# Patient Record
Sex: MALE | Race: WHITE | Employment: FULL TIME | URBAN - METROPOLITAN AREA
[De-identification: names, ages, dates, MRNs, and addresses within clinical notes are randomized per-mention and may not be internally consistent; named-entity substitution may affect disease eponyms.]

---

## 2017-01-06 ENCOUNTER — CARE COORDINATION (OUTPATIENT)
Dept: CARDIOLOGY | Facility: CLINIC | Age: 32
End: 2017-01-06

## 2017-01-06 DIAGNOSIS — D86.85 CARDIAC SARCOIDOSIS: Primary | ICD-10-CM

## 2017-01-06 NOTE — PROGRESS NOTES
Called patient to review recommendations of pulmonary team and Dr. Pino to have CT chest to evaluate mediastinal lymph node for possible biopsy to confirm sarcoidosis. Coordinated CT chest at local hospital on Monday afternoon. They will push images to us then pulmonary team will review. Requested by local [facility] to complete pre-authorization for testing. Will contact insurance on Monday AM to coordinate this then fax letter to them at [phone], will also call them with can update at [phone]. Spoke with Janeen/Radha. Patient gave insurance info of [employer] member ID [id number] with phone [phone]. Will update patient on plan of care once images have been reviewed and plan for biopsy determined. They currently have taken off work and plan to be in the area Thursday and Friday.

## 2017-01-09 ENCOUNTER — TRANSFERRED RECORDS (OUTPATIENT)
Dept: HEALTH INFORMATION MANAGEMENT | Facility: CLINIC | Age: 32
End: 2017-01-09

## 2017-01-09 ENCOUNTER — PRE VISIT (OUTPATIENT)
Dept: CARDIOLOGY | Facility: CLINIC | Age: 32
End: 2017-01-09

## 2017-01-09 NOTE — PROGRESS NOTES
Spoke with [place] [employer] regarding CT chest per request of [facility]. They confirmed pre-authorization is not required. Updated team there and patient. They assured images will be pushed after, notified our film room to be looking for them. Once seen will contact pulmonary team for review and once plan is known will contact patient.

## 2017-01-11 ENCOUNTER — CARE COORDINATION (OUTPATIENT)
Dept: CARDIOLOGY | Facility: CLINIC | Age: 32
End: 2017-01-11

## 2017-01-11 ENCOUNTER — TELEPHONE (OUTPATIENT)
Dept: SURGERY | Facility: CLINIC | Age: 32
End: 2017-01-11

## 2017-01-11 NOTE — PROGRESS NOTES
Spoke with patient regarding plan for EBUS on Monday. He requested changing clinic appointments to next Tuesday as he'll be traveling from 6-8 hours away. Was able to change appointments to Tuesday as he was able to set up H & P locally tomorrow and will have faxed to clinic as Yadi instructed. Sent [employer] message with itinerary for Tuesday. He verbalized understanding and agrees with plan.    Jeanine Kearns RN BSN  Cardiology Care Coordinator  [phone]   (press option #1 for the University, then option #3 to speak with a nurse)

## 2017-01-11 NOTE — PROGRESS NOTES
Pt called triage; call back: [phone]    Calling to verify if CT has been received, if biopsy schedule.   Pt aware of appts on 1/13- but thought other testing was to be scheduled on Thursday 1/12    Unable to transfer, routed to RNCC

## 2017-01-11 NOTE — TELEPHONE ENCOUNTER
I spoke to patient about scheduling him for a procedure with Dr. Hogue on Monday, 1/16, as requested by Dr. Pino.  I will send information via [employer] to him.  He confirmed he is NOT taking any blood thinners.  He has an appointment with Dr. Pino Friday 1/13 who will perform pre-op H & P (although he was going to try to reschedule it for Tues, 1/17, in which case he needs to go to his primary clinic to get it this week and fax it to ).  He was told when and where to check in, and to have an adult with him to drive him home.   I gave him my direct # to call if questions.

## 2017-01-13 ENCOUNTER — TELEPHONE (OUTPATIENT)
Dept: SURGERY | Facility: CLINIC | Age: 32
End: 2017-01-13

## 2017-01-13 NOTE — TELEPHONE ENCOUNTER
I tried to reach patient, left VM telling him that Dr. Hogue had a scheduling conflict for Monday 1/16 and his OR case was moved to Tues., 1/17.   I told him to arrive at 1pm for a 3pm case.   I left my # and Etta's # to call if he has questions or is unable to make this date work.

## 2017-01-13 NOTE — TELEPHONE ENCOUNTER
I spoke to Temo to let him know that a scheduling conflict came up with Dr. Hogue and we needed to move his OR case off of Monday 1/16.  We talked about options.  He was planning to come Monday night with his parents and stay at a hotel.       After checking with various departments, I suggested we move OR case to Wed AM, 1/18.   Clinic appointments including labs and defib check are staying on 1/17 afternoon.   He may decide to drive in on Tues AM now but agreed with this plan.   I reviewed with him where and when to check in for OR Wednesday, NPO guidelines, etc.

## 2017-01-17 ENCOUNTER — OFFICE VISIT (OUTPATIENT)
Dept: CARDIOLOGY | Facility: CLINIC | Age: 32
End: 2017-01-17
Attending: INTERNAL MEDICINE
Payer: COMMERCIAL

## 2017-01-17 ENCOUNTER — ANESTHESIA EVENT (OUTPATIENT)
Dept: SURGERY | Facility: CLINIC | Age: 32
End: 2017-01-17
Payer: COMMERCIAL

## 2017-01-17 ENCOUNTER — PRE VISIT (OUTPATIENT)
Dept: CARDIOLOGY | Facility: CLINIC | Age: 32
End: 2017-01-17

## 2017-01-17 VITALS
DIASTOLIC BLOOD PRESSURE: 75 MMHG | WEIGHT: 249 LBS | OXYGEN SATURATION: 95 % | HEART RATE: 83 BPM | HEIGHT: 73 IN | BODY MASS INDEX: 33 KG/M2 | SYSTOLIC BLOOD PRESSURE: 123 MMHG

## 2017-01-17 VITALS
OXYGEN SATURATION: 95 % | SYSTOLIC BLOOD PRESSURE: 123 MMHG | DIASTOLIC BLOOD PRESSURE: 75 MMHG | WEIGHT: 249.4 LBS | HEART RATE: 83 BPM | BODY MASS INDEX: 33.05 KG/M2 | HEIGHT: 73 IN

## 2017-01-17 DIAGNOSIS — D86.85 CARDIAC SARCOIDOSIS: Primary | ICD-10-CM

## 2017-01-17 DIAGNOSIS — D86.85 CARDIAC SARCOIDOSIS: ICD-10-CM

## 2017-01-17 DIAGNOSIS — Z86.74 H/O CARDIAC ARREST: ICD-10-CM

## 2017-01-17 DIAGNOSIS — Z86.74 H/O CARDIAC ARREST: Primary | ICD-10-CM

## 2017-01-17 PROBLEM — Z95.810 ICD (IMPLANTABLE CARDIOVERTER-DEFIBRILLATOR) IN PLACE: Status: ACTIVE | Noted: 2017-01-17

## 2017-01-17 PROBLEM — I47.20 V-TACH (H): Status: ACTIVE | Noted: 2017-01-17

## 2017-01-17 LAB
ALBUMIN SERPL-MCNC: 4 G/DL (ref 3.4–5)
ALP SERPL-CCNC: 80 U/L (ref 40–150)
ALT SERPL W P-5'-P-CCNC: 45 U/L (ref 0–70)
ANION GAP SERPL CALCULATED.3IONS-SCNC: 7 MMOL/L (ref 3–14)
AST SERPL W P-5'-P-CCNC: 17 U/L (ref 0–45)
BASOPHILS # BLD AUTO: 0 10E9/L (ref 0–0.2)
BASOPHILS NFR BLD AUTO: 0.2 %
BILIRUB SERPL-MCNC: 0.4 MG/DL (ref 0.2–1.3)
BUN SERPL-MCNC: 18 MG/DL (ref 7–30)
CALCIUM SERPL-MCNC: 9.2 MG/DL (ref 8.5–10.1)
CHLORIDE SERPL-SCNC: 105 MMOL/L (ref 94–109)
CO2 SERPL-SCNC: 30 MMOL/L (ref 20–32)
CREAT SERPL-MCNC: 0.98 MG/DL (ref 0.66–1.25)
DIFFERENTIAL METHOD BLD: NORMAL
EOSINOPHIL # BLD AUTO: 0 10E9/L (ref 0–0.7)
EOSINOPHIL NFR BLD AUTO: 0.1 %
ERYTHROCYTE [DISTWIDTH] IN BLOOD BY AUTOMATED COUNT: 11.4 % (ref 10–15)
GFR SERPL CREATININE-BSD FRML MDRD: 88 ML/MIN/1.7M2
GLUCOSE SERPL-MCNC: 121 MG/DL (ref 70–99)
HCT VFR BLD AUTO: 47.5 % (ref 40–53)
HGB BLD-MCNC: 16.4 G/DL (ref 13.3–17.7)
IMM GRANULOCYTES # BLD: 0.1 10E9/L (ref 0–0.4)
IMM GRANULOCYTES NFR BLD: 0.5 %
LYMPHOCYTES # BLD AUTO: 1.3 10E9/L (ref 0.8–5.3)
LYMPHOCYTES NFR BLD AUTO: 13.4 %
MCH RBC QN AUTO: 31.7 PG (ref 26.5–33)
MCHC RBC AUTO-ENTMCNC: 34.5 G/DL (ref 31.5–36.5)
MCV RBC AUTO: 92 FL (ref 78–100)
MONOCYTES # BLD AUTO: 0.3 10E9/L (ref 0–1.3)
MONOCYTES NFR BLD AUTO: 3.1 %
NEUTROPHILS # BLD AUTO: 7.8 10E9/L (ref 1.6–8.3)
NEUTROPHILS NFR BLD AUTO: 82.7 %
NRBC # BLD AUTO: 0 10*3/UL
NRBC BLD AUTO-RTO: 0 /100
NT-PROBNP SERPL-MCNC: 45 PG/ML (ref 0–125)
PLATELET # BLD AUTO: 214 10E9/L (ref 150–450)
POTASSIUM SERPL-SCNC: 5 MMOL/L (ref 3.4–5.3)
PROT SERPL-MCNC: 7.7 G/DL (ref 6.8–8.8)
RBC # BLD AUTO: 5.17 10E12/L (ref 4.4–5.9)
SODIUM SERPL-SCNC: 141 MMOL/L (ref 133–144)
TROPONIN I SERPL-MCNC: NORMAL UG/L (ref 0–0.04)
WBC # BLD AUTO: 9.5 10E9/L (ref 4–11)

## 2017-01-17 PROCEDURE — 93289 INTERROG DEVICE EVAL HEART: CPT | Performed by: INTERNAL MEDICINE

## 2017-01-17 PROCEDURE — 80053 COMPREHEN METABOLIC PANEL: CPT | Performed by: INTERNAL MEDICINE

## 2017-01-17 PROCEDURE — 85025 COMPLETE CBC W/AUTO DIFF WBC: CPT | Performed by: INTERNAL MEDICINE

## 2017-01-17 PROCEDURE — 84484 ASSAY OF TROPONIN QUANT: CPT | Performed by: INTERNAL MEDICINE

## 2017-01-17 PROCEDURE — 99212 OFFICE O/P EST SF 10 MIN: CPT

## 2017-01-17 PROCEDURE — 99212 OFFICE O/P EST SF 10 MIN: CPT | Mod: 27

## 2017-01-17 PROCEDURE — 99212 OFFICE O/P EST SF 10 MIN: CPT | Mod: 25,ZF

## 2017-01-17 PROCEDURE — 93260 PRGRMG DEV EVAL IMPLTBL SYS: CPT | Mod: ZF

## 2017-01-17 PROCEDURE — 99215 OFFICE O/P EST HI 40 MIN: CPT | Mod: 25 | Performed by: INTERNAL MEDICINE

## 2017-01-17 PROCEDURE — 83880 ASSAY OF NATRIURETIC PEPTIDE: CPT | Performed by: INTERNAL MEDICINE

## 2017-01-17 PROCEDURE — 36415 COLL VENOUS BLD VENIPUNCTURE: CPT | Performed by: INTERNAL MEDICINE

## 2017-01-17 RX ORDER — SOTALOL HYDROCHLORIDE 160 MG/1
80 TABLET ORAL 2 TIMES DAILY
COMMUNITY
Start: 2015-08-28

## 2017-01-17 RX ORDER — SULFAMETHOXAZOLE/TRIMETHOPRIM 800-160 MG
TABLET ORAL
Status: ON HOLD | COMMUNITY
Start: 2016-03-04 | End: 2019-03-30

## 2017-01-17 ASSESSMENT — PAIN SCALES - GENERAL
PAINLEVEL: NO PAIN (0)
PAINLEVEL: NO PAIN (0)

## 2017-01-17 NOTE — Clinical Note
1/17/2017      RE: Temo George  [address] [facility] [place] [address]       CC: Dr. Siva Lam:   [facility]     Dear Siva,     I had the pleasure of seeing Temo George in  [facility] Cardiac Sarcoidosis clinic on January 17, 2017. As you know she is a pleasant 31 year old male who had an out of hospital cardiac arrest on 8/6/15 with a negative coronary CTA who then underwent secondary prevention SubQ ICD 8/20/2015. He has had a complicated course which I will summarize below but he is here to determine whether we can definitively assess for cardiac sarcoidosis and the develop a treatment plan going forward.     Shortly after his ICD placement he had recurrent VT causing LOC and therapy in October of 2016.  He was started on lidocaine gtt and Sotalol 120 mg PO BID. He was stabilized without recurrent VT and was discharged on Sotalol. He then came back for an outpatient EPS possible ablation on 9/4/15 which was non-inducible and no ablation was performed. He was then referred to [facility] for evaluation.     At that time he underwent a CMRI which showed evidence of subepicardial delayed enhancement along the inferior and inferolateral LV walls extending from base nearly to the apex. He subsequently had a PET in March 2016 which showed moderate perfusion abnormality involving inferior and inferolateral segments at the mid and basal level of LV with focal myocardial FDG uptake. Imaging was felt consistent with cardiac sarcoidosis.     At that time he was started on oral Prednisone, Bactrim, and Prilosec. He has gained about 20-30 lbs since being on steroids overall. A repeat PET in 9/2016 showed small mild inferior inferior and inferolateral perfusion abnormality and no FDG uptake. His prednisone has been tapered down at that time. He then had more shocks after this and his prednisone dose was increased empirically again, His sotolol is now at 160 mg BID. He has remained arrhyhtmia  free since.     Of note, other than when he first presented with a high VT burden, his EF has remains normal . He does not report any functional limitation however he is afraid to exert himself too much as this seems to correlate with times that that VT recurs.   He denies PND/orthopnea, chest pain, LE edema.     He is also seeing my EP colleague today Nav Pak who specializes in cardiac sarcoidosis.  He presents today for a second opinion on management of his VT and cardiac sarcoidosis.    He does not have any other sites of sarcoidosis that have yet been identified and denies skin, eye symptoms.     PAST MEDICAL HISTORY:  Past Medical History   Diagnosis Date     Cardiac arrest (H)      SVT (supraventricular tachycardia) (H)      Pulmonary embolism (H)      h/o that occurred in hospital     V-tach (H) 1/17/2017     H/O cardiac arrest 1/17/2017 8/2015     ICD (implantable cardioverter-defibrillator) in place 1/17/2017     Cardiac sarcoidosis (H) 1/17/2017   of note: SVT presented at 1 month old and then ultimately s/p ablation at 9 years       CURRENT MEDICATIONS:  Current Outpatient Prescriptions   Medication Sig Dispense Refill     omeprazole (PRILOSEC) 20 MG CR capsule        sulfamethoxazole-trimethoprim (BACTRIM DS) 800-160 MG per tablet        sotalol (BETAPACE) 160 MG tablet Take 160 mg by mouth 2 times daily       MAGNESIUM PO Magnesium Taurate 400 mg tab. 1 tablet by mouth two times a day.       PREDNISONE PO Take 20 mg by mouth daily         PAST SURGICAL HISTORY:  Past Surgical History   Procedure Laterality Date     Ep ablation child       S/p icd placement       [employer] defibrillator     Bronchoscopy rigid or flexible w/transendoscopic endobronchial ultrasound guided N/A 1/18/2017     Procedure: BRONCHOSCOPY RIGID OR FLEXIBLE W/TRANSENDOSCOPIC ENDOBRONCHIAL ULTRASOUND GUIDED;  Surgeon: Jose Antonio Hogue MD;  Location: [facility] OR       ALLERGIES:     Allergies   Allergen Reactions      "Penicillins        FAMILY HISTORY:  There is no family history of sudden cardiac death or premature heart failure. They are unaware of any other family members with autoimmune disease.       SOCIAL HISTORY:  Social History   Substance Use Topics     Smoking status: Never Smoker      Smokeless tobacco: Not on file     Alcohol Use: Yes      Comment: rarely       ROS:   A comprehensive 10 point review of systems negative other than as mentioned in HPI.    Exam:  /75 mmHg  Pulse 83  Ht 1.854 m (6' 1\")  Wt 112.946 kg (249 lb)  BMI 32.86 kg/m2  SpO2 95%  GENERAL APPEARANCE: healthy, alert and no distress, obese, pleasant   HEENT: no icterus, no xanthelasmas, normal pupil size and reaction, normal palate, mucosa moist, no central cyanosis  NECK: no adenopathy, no asymmetry, masses, or scars, thyroid normal to palpation and no bruits, JVP not elevated  RESPIRATORY: lungs clear to auscultation - no rales, rhonchi or wheezes, no use of accessory muscles, no retractions, respirations are unlabored, normal respiratory rate  CARDIOVASCULAR: PMI focal,  regular rhythm, normal S1 with physiologic split S2, no S3 or S4 and no murmur, click or rub   ABDOMEN: soft, non tender, without hepatosplenomegaly, no masses palpable, bowel sounds normal, aorta not enlarged by palpation, no abdominal bruits  EXTREMITIES: peripheral pulses normal, no edema, no bruits  NEURO: alert and oriented to person/place/time, normal speech, gait and affect  VASC: Radial, femoral, dorsalis pedis and posterior tibialis pulses are normal in volumes and symmetric bilaterally. No bruits are heard.  SKIN: no ecchymoses, no rashes    Labs:  Reviewed.     Testing/Procedures:  12/2015 ECHOCARDIOGRAM (OSH REPORT):       2/2016 PET (OSH REPORT):      3/2016 PET (OSH REPORT):      ECG: sinus rhythm, isolated Q waves in III     Assessment and Plan:   In summary this is a very pleasant 31 year old male who has a past medical history significant an out of " [facility] on 8/6/15 and a large burden of VT since that time who has a presumptive diagnosis of cardiac sarcoidosis based on imaging and a clinical syndrome that was consistent with this diagnosis. He is presently on sotalol and a relatively high dose of prednisone and has had a period of relative stability.     The three questions that we need to address include 1) is cardiac sarcoid the diagnosis  2) is there active inflammation at present 3) and what will eventually be a maintenance immunosuppression that will be steroid sparing if this is sarcoidosis     With regard to the diagnosis, his imaging is highly consistent with cardiac sarcoidosis as is his clinical syndrome, He does have lymphadenopathy on CT imaging which appears amenable for a biopsy. The plan is for an EBUS tomorrow which he is already scheduled for. Given that we are exposing him to immune suppression at a young age it does feel worth it to our team to try for a definitive tissue diagnosis.     With regard to the question of disease activity - it may be that his recurrent VT is not indicative of ongoing inflammation  and this may be scar based. We are going to repeat his PET scan to ask the question of whether there is any disease activity at present and also obtain a holter to determine whether the PCVs are polymorphic, monomorphic, and are all of one morphology and cycle length,  or are there several. This will help monitor PVC burden and may help up with planning of any future ablations.      With regard to the choice of treatments, once the inflammation is under control by PET we often change to a steroid sparing agent:  I  think he would be an excellent candidate for infliximab or methotrexate and I think it is very important that we get him off of steroids soon. I also have a once monthly steroid protocol that we are using in many patients which has been very well tolerated and has been successful at controlling disease activity and this  spares systemic side effects.. We are going to present him at our next sarcoid multidisciplinary conference to have the weigh in from the group. If his PET is positive we may continue steroids for another month or two before switching over to a maintenance agent.      We are fortunate that despite the delayed enhancement present on MRI and inflammation present on PET that his ejection fraction remains normal and there is no clinical diagnosis of heart failure, yet. He does not require neurohormonal blockade at this point.     For his VT the higher dose of sotolol and perhaps the steroids appear to be effective at present. He has an ICD in place.     Overall plan:   We will look for the EBUS results and are ordering a holter and repeat cardiac PET to help guide our next decision.   He will be presented at our sarcoid conference      Please feel free to contact me with any further questions and thank you so much for involving us in the care of Temo.       Sincerely,     Kaia Pino  [facility] Cardiology

## 2017-01-17 NOTE — Clinical Note
1/17/2017      RE: Temo George  [address] [facility] [address]       Dear Colleague,    Thank you for the opportunity to participate in the care of your patient, Temo George, at the [facility]. Please see a copy of my visit note below.    HPI:   Mr. George is a 31 year old male who has a past medical history significant for SVT started at 1 mo age and then ultimately s/p ablation at 9 years old by Dr. Lynn, out of hospital S[facility] on 8/6/15 from VT Storm work up negative s/p secondary prevention SubQ ICD 8/20/2015 with recurrent ICD shocks on 8/29/16, and October 2016, imaging supporting cardiac sarcoidosis, and PE when hospitalized. He initially he presented to an OSH on 8/6/15 after suffering an out of hospital arrest. He underwent a coronary CT which was unremarkable, had normal baseline ECG, and initial Echo showed LVEF 40-45% and at time of discharge was 55-60%. He underwent a SubQ ICD implant on 8/20/15. Nine days later, on 8/29/15, he had sudden onset of lightheadedness, palpitations followed in quick succession by ICD shocks. VT CL was 200-240 ms. He converted with each shock but recurred. He was started on lidocaine gtt and Sotalol 120 mg PO BID. He was stabilized without recurrent VT and was discharged on Sotalol. He then came back for an outpatient EPS possible ablation on 9/4/15 which was non-inducible and no ablation was performed. He was then referred to [facility] for evaluation. He underwent an CMRI showed evidence of subepicardial DE along the inferior and inferolateral LV walls extending from base nearly to the apex. He subsequently had a PET in March 2016 which showed moderate perfusion abnormality involving inferior and inferolateral segments at the mid and basal level of LV with focal myocardial FDG uptake. Imaging was felt consistent with cardiac sarcoidosis. He was started on oral Prednisone, Bactrim, and Prilosec. A  repeat PET in 9/2016 showed small mild inferior inferior and inferolateral perfusion abnormality and no FDG uptake. His prednisone has been tapered down but he has continued on steroids since March 2016. He did well until October 2016 when he had 2 ICD shocks followed by another ICD shock one week later. His Sotalol was increased to 160 mg BID. He has remained arrhyhtmia free since. He presents today for a second opinion on management of his VT and cardiac sarcoidosis.     He reports feeling well. His device interrogation today shows normal device function. He denies any chest pain, dizziness, lightheadedness, shortness of breath, palpitations, or syncopal symptoms. He has no family history of early CAD, SCD, or congenital anomalies. He is also seeing Dr. Pino today with plans for EBUS biopsy tomorrow. Current cardiac medications include: Sotalol and Prednisone.     PAST MEDICAL HISTORY:  Past Medical History   Diagnosis Date     Cardiac arrest (H)      SVT (supraventricular tachycardia) (H)      Pulmonary embolism (H)      h/o that occurred in hospital     V-tach (H) 1/17/2017     H/O cardiac arrest 1/17/2017 8/2015     ICD (implantable cardioverter-defibrillator) in place 1/17/2017     Cardiac sarcoidosis (H) 1/17/2017       CURRENT MEDICATIONS:  Current Outpatient Prescriptions   Medication Sig Dispense Refill     omeprazole (PRILOSEC) 20 MG CR capsule        sulfamethoxazole-trimethoprim (BACTRIM DS) 800-160 MG per tablet        sotalol (BETAPACE) 160 MG tablet Take 160 mg by mouth 2 times daily       MAGNESIUM PO Magnesium Taurate 400 mg tab. 1 tablet by mouth two times a day.       PREDNISONE PO Take 20 mg by mouth daily         PAST SURGICAL HISTORY:  Past Surgical History   Procedure Laterality Date     Ep ablation child       S/p icd placement       [employer] defibrillator       ALLERGIES:     Allergies   Allergen Reactions     Penicillins        FAMILY HISTORY:  No family history on  "file.    SOCIAL HISTORY:  Social History   Substance Use Topics     Smoking status: Never Smoker      Smokeless tobacco: Not on file     Alcohol Use: Yes      Comment: rarely       ROS:   A comprehensive 10 point review of systems negative other than as mentioned in HPI.  Exam:  /75 mmHg  Pulse 83  Ht 1.854 m (6' 1\")  Wt 113.127 kg (249 lb 6.4 oz)  BMI 32.91 kg/m2  SpO2 95%  GENERAL APPEARANCE: healthy, alert and no distress  HEENT: no icterus, no xanthelasmas, normal pupil size and reaction, normal palate, mucosa moist, no central cyanosis  NECK: no adenopathy, no asymmetry, masses, or scars, thyroid normal to palpation and no bruits, JVP not elevated  RESPIRATORY: lungs clear to auscultation - no rales, rhonchi or wheezes, no use of accessory muscles, no retractions, respirations are unlabored, normal respiratory rate  CARDIOVASCULAR: regular rhythm, normal S1 with physiologic split S2, no S3 or S4 and no murmur, click or rub, precordium quiet with normal PMI.  ABDOMEN: soft, non tender, without hepatosplenomegaly, no masses palpable, bowel sounds normal, aorta not enlarged by palpation, no abdominal bruits  EXTREMITIES: peripheral pulses normal, no edema, no bruits  NEURO: alert and oriented to person/place/time, normal speech, gait and affect  VASC: Radial, femoral, dorsalis pedis and posterior tibialis pulses are normal in volumes and symmetric bilaterally. No bruits are heard.  SKIN: no ecchymoses, no rashes    Labs:  Reviewed.     Testing/Procedures:  12/2015 ECHOCARDIOGRAM (OSH REPORT):       2/2016 PET (OSH REPORT):      3/2016 PET (OSH REPORT):      Assessment and Plan:   Mr. George is a 31 year old male who has a past medical history significant for SVT started at 1 mo age and then ultimately s/p ablation at 9 years old by Dr. Lynn, out of hospital [facility] on 8/6/15 from VT Storm work up negative s/p secondary prevention SubQ ICD 8/20/2015 with recurrent ICD shocks on 8/29/16, and October 2016 " on Sotalol, imaging supporting cardiac sarcoidosis on steriods, and PE when hospitalized in 8/2015. His prednisone has been tapered down but he has continued on steroids since March 2016. His device interrogation today shows normal device function. He presents today for a second opinion on management of his VT and cardiac sarcoidosis. Plans for EBUS biopsy tomorrow. Current cardiac medications include: Sotalol and Prednisone.     He initially presented with a VT storm and SCA in August 2015 and had relatively normal work up and underwent a secondary prevention SubQ ICD. He had early recurrence of VT and was placed on Sotalol 120 mg BID and was stable until October 2016 when he had 3 shocks from his ICD (2 in one event and a week later a single shock). His Sotalol dose was increased to 160 mg BID which he continues on today. Previous ECG appears to have stable QTc interval. He has not had any further arrhythmias. He has been on ongoing steroids which have been tapered but has continued since March 2016. Original PET showed active inflammation and repeat PET showed no inflammation. We discussed steroid-sparring therapies that may be needed long term to spare him the long term effects of corticosteroids and prevent steroid dependance. This will be managed by Dr. Sosa. He will undergo EBUS biopsy tomorrow and a repeat PET in the future. We reviewed his most recent VT episodes which are monomorphic and likely scar related, less likely due to inflammation, although we have seen monomorphic VT due to inflammation but it is usually the exception. We would consider ablation for recurrences given he is on max Sotalol dose. We will have him follow up in a year or sooner if need arises.       The patient states understanding and is agreeable with plan.   This patient was seen and evaluated with Dr. Benjamin. The above note reflects our joint assessment and plan.   LIANA Reyes CNP  Pager: [phone]  CC  ISAI SOSA  SAM      Please do not hesitate to contact me if you have any questions/concerns.     Sincerely,     Nav Benjamin MD

## 2017-01-17 NOTE — PATIENT INSTRUCTIONS
Cardiology Provider you saw in clinic today: Dr. Benjamin    Tests Needed:   1. 48 hour 12 lead holter      Follow-up Visit:  1 year with device check prior        You will receive all normal lab and testing results via [employer] or mail if not reviewed in clinic today. Please contact our office if you need assistance with setting up MyChart.    If you need a medication refill please contact your pharmacy. Please allow 3 business days for your refill to be completed.     As always, thank you for trusting us with your health care needs!    If you have any questions regarding your visit please contact your care team:   Cardiology  Telephone Number    Julienne Bynum RN  Electrophysiology Nurse Coordinator [phone]     Call for EP procedure scheduling concerns  Bev Pacheco   EP  [phone]           Device Clinic (Pacemakers, ICDs, Loop)   RN's : Linda Mijares Connie, Dawn During business hours: [phone]    After business hours:   [phone]- select option 4 and ask for job code [id number].

## 2017-01-17 NOTE — NURSING NOTE
Chief Complaint   Patient presents with     Eval/Assessment     NEW- SARCOID REFERREL   NOTE- Patient is looking to be taken off of Prednisone due to liver concerns.

## 2017-01-17 NOTE — NURSING NOTE
Chief Complaint   Patient presents with     New Patient     31 yr old male with h/o cardiac sarcoidosis presenting for evaluation with labs and device check and EKG

## 2017-01-17 NOTE — Clinical Note
1/17/2017      RE: Temo George  [address] [facility] [address]       Dear Colleague,    Thank you for the opportunity to participate in the care of your patient, Temo George, at the [facility]. Please see a copy of my visit note below.    No notes on file    Please do not hesitate to contact me if you have any questions/concerns.     Sincerely,     Kaia Pino MD

## 2017-01-17 NOTE — PROGRESS NOTES
Patient seen in clinic for evaluation and iterative programming of his [employer] SQ ICD per MD orders.  Patient has an appointment to see Dr. Benjamin and Dr. Pino today.  Patient is new to our device clinic today and routinely has his ICD checked in [facility].  Normal ICD function.  No new episodes recorded since his last ICD shock on 10/22/16.  Intrinsic rhythm = SB @ 56 bpm.  Remaining battery life to LANNY = 76%.  Electrode impedance status = OK.  Patient reports that he is feeling well and denies complaints.      SQ ICD

## 2017-01-17 NOTE — ANESTHESIA PREPROCEDURE EVALUATION
"  Anesthesia Evaluation     . Pt has had prior anesthetic. Type: General    No history of anesthetic complications     ROS/MED HX    ENT/Pulmonary:      (-) tobacco use, asthma and sleep apnea   Neurologic:      (-) seizures, CVA and Neuropathy   Cardiovascular: Comment: H/o cardiac arrest - 8/2015      (+) ----. : . . . :ICD . dysrhythmias Other and PVCs, Irregular Heartbeat/Palpitations, .      (-) CAD, CHF and valvular problems/murmurs   METS/Exercise Tolerance:  >4 METS   Hematologic:  - neg hematologic  ROS   (+) History of blood clots pt is not anticoagulated, -      Musculoskeletal:         GI/Hepatic:        (-) GERD and liver disease   Renal/Genitourinary:      (-) renal disease   Endo:  - neg endo ROS    (-) Type I DM, Type II DM, thyroid disease and obesity   Psychiatric:  - neg psychiatric ROS       Infectious Disease:  - neg infectious disease ROS       Malignancy:      - no malignancy   Other:    (+) No chance of pregnancy C-spine cleared: Yes, no H/O Chronic Pain,no other significant disability              Physical Exam  Normal systems: cardiovascular, pulmonary and dental    Airway   Mallampati: II  TM distance: >3 FB  Neck ROM: full    Dental     Cardiovascular   Rhythm and rate: regular and normal      Pulmonary    breath sounds clear to auscultation    Other findings: /72 mmHg  Temp(Src) 36.6  C (97.8  F) (Oral)  Resp 16  Ht 1.854 m (6' 1\")  Wt 113.9 kg (251 lb 1.7 oz)  BMI 33.14 kg/m2  SpO2 100%                  ANESTHESIA PREOP EVALUATION    Procedure: Procedure(s):  Flexible Bronchoscopy, Endobronchial Ultrasound With Transbronchial Biopsies  - Wound Class: II-Clean Contaminated Mediastinal Adenopathy     HPI: Temo George is a 31 year old male who is presenting for above stated procedure.    PMHx/PSHx/ROS:  Past Medical History   Diagnosis Date     Cardiac arrest (H)      SVT (supraventricular tachycardia) (H)      Pulmonary embolism (H)      h/o that occurred in hospital     " V-tach (H) 1/17/2017     H/O cardiac arrest 1/17/2017 8/2015     ICD (implantable cardioverter-defibrillator) in place 1/17/2017     Cardiac sarcoidosis (H) 1/17/2017       Past Surgical History   Procedure Laterality Date     Ep ablation child       S/p icd placement       [employer] defibrillator       ROS as stated above    Soc Hx:   Social History   Substance Use Topics     Smoking status: Never Smoker      Smokeless tobacco: Not on file     Alcohol Use: Yes      Comment: rarely       Allergies:   Allergies   Allergen Reactions     Penicillins        Meds:   Prescriptions prior to admission   Medication Sig Dispense Refill Last Dose     omeprazole (PRILOSEC) 20 MG CR capsule    1/18/2017 at 0705     sotalol (BETAPACE) 160 MG tablet Take 160 mg by mouth 2 times daily   1/18/2017 at 0705     MAGNESIUM PO Magnesium Taurate 400 mg tab. 1 tablet by mouth two times a day.   1/17/2017 at 0700     PREDNISONE PO Take 20 mg by mouth daily   1/17/2017 at 0700     sulfamethoxazole-trimethoprim (BACTRIM DS) 800-160 MG per tablet    1/16/2017       No current outpatient prescriptions on file.       Physical Exam:  VS: Temp:  [36.6  C (97.8  F)] 36.6  C (97.8  F)  Pulse:  [83] 83  Heart Rate:  [77] 77  Resp:  [16] 16  BP: (117-123)/(72-75) 117/72 mmHg  SpO2:  [95 %-100 %] 100 %   100%, Weight   Wt Readings from Last 2 Encounters:   01/18/17 113.9 kg (251 lb 1.7 oz)   01/17/17 112.946 kg (249 lb)     Patient discussed with Staff Anesthesiologist.    Sukh Bansal  Anesthesia Resident CA-1  Pager [phone]  January 17, 2017, 3:14 PM    Anesthesia Plan      History & Physical Review  History and physical reviewed and following examination; no interval change.    ASA Status:  3 .    NPO Status:  > 8 hours    Plan for General and ETT with Intravenous induction. Maintenance will be Balanced.    PONV prophylaxis:  Ondansetron (or other 5HT-3) and Dexamethasone or Solumedrol  Additional equipment: 2nd IV Temo George is a 31  year old male with mediastinal adenopathy who is scheduled for a bronchoscopy with endotracheal ultrasound for transbronchial biopsy with Dr. Hogue on 1/18/2017. PMH significant for previous cardiac arrest with SQ ICD placement appears stable, Hx of SVT with V Tach, and cardiac sarcoidosis.     I agree with the assessment/plan by the anesthesia resident.  I have personally evaluated the patient and gone over risks/benefits of anesthesia with them.      Carlos Celaya MD  Staff Anesthesiologist  Phone: [phone]  January 18, 2017, 7:34 AM        Postoperative Care  Postoperative pain management:  IV analgesics, Oral pain medications and Multi-modal analgesia.      Consents  Anesthetic plan, risks, benefits and alternatives discussed with:  Patient or representative and Patient.  Use of blood products discussed: No .   .                          .

## 2017-01-17 NOTE — NURSING NOTE
Cardiac Testing: Patient given instructions regarding  Cardiac PET scan. Discussed purpose, preparation, procedure and when to expect results reported back to the patient. Patient demonstrated understanding of this information and agreed to call with further questions or concerns.  Labs: Patient was given results of the laboratory testing obtained today. Patient demonstrated understanding of this information and agreed to call with further questions or concerns.   Return Appointment: Patient given instructions regarding scheduling next clinic visit. Patient demonstrated understanding of this information and agreed to call with further questions or concerns.  Patient stated he understood all health information given and agreed to call with further questions or concerns.

## 2017-01-17 NOTE — PATIENT INSTRUCTIONS
It was a pleasure to see you in clinic today.  Please do not hesitate to call with any questions or concerns.      Esther Davis, RN, MS, CCRN  Electrophysiology Nurse Clinician  [facility]    During Business Hours Please Call:  [phone]  After Hours Please Call:  [phone] - select option #4 and ask for job code [phone]

## 2017-01-17 NOTE — PROGRESS NOTES
HPI:   Mr. George is a 31 year old male who has a past medical history significant for SVT started at 1 mo age and then ultimately s/p ablation at 9 years old by Dr. Lynn, out of hospital [facility] on 8/6/15 from VT Storm work up negative s/p secondary prevention SubQ ICD 8/20/2015 with recurrent ICD shocks on 8/29/16, and October 2016, imaging supporting cardiac sarcoidosis, and PE when hospitalized. He initially he presented to an OSH on 8/6/15 after suffering an out of hospital arrest. He underwent a coronary CT which was unremarkable, had normal baseline ECG, and initial Echo showed LVEF 40-45% and at time of discharge was 55-60%. He underwent a SubQ ICD implant on 8/20/15. Nine days later, on 8/29/15, he had sudden onset of lightheadedness, palpitations followed in quick succession by ICD shocks. VT CL was 200-240 ms. He converted with each shock but recurred. He was started on lidocaine gtt and Sotalol 120 mg PO BID. He was stabilized without recurrent VT and was discharged on Sotalol. He then came back for an outpatient EPS possible ablation on 9/4/15 which was non-inducible and no ablation was performed. He was then referred to [facility] for evaluation. He underwent an CMRI showed evidence of subepicardial DE along the inferior and inferolateral LV walls extending from base nearly to the apex. He subsequently had a PET in March 2016 which showed moderate perfusion abnormality involving inferior and inferolateral segments at the mid and basal level of LV with focal myocardial FDG uptake. Imaging was felt consistent with cardiac sarcoidosis. He was started on oral Prednisone, Bactrim, and Prilosec. A repeat PET in 9/2016 showed small mild inferior inferior and inferolateral perfusion abnormality and no FDG uptake. His prednisone has been tapered down but he has continued on steroids since March 2016. He did well until October 2016 when he had 2 ICD shocks followed by another ICD shock one week later. His  Sotalol was increased to 160 mg BID. He has remained arrhyhtmia free since. He presents today for a second opinion on management of his VT and cardiac sarcoidosis.     He reports feeling well. His device interrogation today shows normal device function. He denies any chest pain, dizziness, lightheadedness, shortness of breath, palpitations, or syncopal symptoms. He has no family history of early CAD, SCD, or congenital anomalies. He is also seeing Dr. Pino today with plans for EBUS biopsy tomorrow. Current cardiac medications include: Sotalol and Prednisone.     PAST MEDICAL HISTORY:  Past Medical History   Diagnosis Date     Cardiac arrest (H)      SVT (supraventricular tachycardia) (H)      Pulmonary embolism (H)      h/o that occurred in hospital     V-tach (H) 1/17/2017     H/O cardiac arrest 1/17/2017 8/2015     ICD (implantable cardioverter-defibrillator) in place 1/17/2017     Cardiac sarcoidosis (H) 1/17/2017       CURRENT MEDICATIONS:  Current Outpatient Prescriptions   Medication Sig Dispense Refill     omeprazole (PRILOSEC) 20 MG CR capsule        sulfamethoxazole-trimethoprim (BACTRIM DS) 800-160 MG per tablet        sotalol (BETAPACE) 160 MG tablet Take 160 mg by mouth 2 times daily       MAGNESIUM PO Magnesium Taurate 400 mg tab. 1 tablet by mouth two times a day.       PREDNISONE PO Take 20 mg by mouth daily         PAST SURGICAL HISTORY:  Past Surgical History   Procedure Laterality Date     Ep ablation child       S/p icd placement       [employer] defibrillator       ALLERGIES:     Allergies   Allergen Reactions     Penicillins        FAMILY HISTORY:  No family history on file.    SOCIAL HISTORY:  Social History   Substance Use Topics     Smoking status: Never Smoker      Smokeless tobacco: Not on file     Alcohol Use: Yes      Comment: rarely       ROS:   A comprehensive 10 point review of systems negative other than as mentioned in HPI.  Exam:  /75 mmHg  Pulse 83  Ht 1.854  "m (6' 1\")  Wt 113.127 kg (249 lb 6.4 oz)  BMI 32.91 kg/m2  SpO2 95%  GENERAL APPEARANCE: healthy, alert and no distress  HEENT: no icterus, no xanthelasmas, normal pupil size and reaction, normal palate, mucosa moist, no central cyanosis  NECK: no adenopathy, no asymmetry, masses, or scars, thyroid normal to palpation and no bruits, JVP not elevated  RESPIRATORY: lungs clear to auscultation - no rales, rhonchi or wheezes, no use of accessory muscles, no retractions, respirations are unlabored, normal respiratory rate  CARDIOVASCULAR: regular rhythm, normal S1 with physiologic split S2, no S3 or S4 and no murmur, click or rub, precordium quiet with normal PMI.  ABDOMEN: soft, non tender, without hepatosplenomegaly, no masses palpable, bowel sounds normal, aorta not enlarged by palpation, no abdominal bruits  EXTREMITIES: peripheral pulses normal, no edema, no bruits  NEURO: alert and oriented to person/place/time, normal speech, gait and affect  VASC: Radial, femoral, dorsalis pedis and posterior tibialis pulses are normal in volumes and symmetric bilaterally. No bruits are heard.  SKIN: no ecchymoses, no rashes    Labs:  Reviewed.     Testing/Procedures:  12/2015 ECHOCARDIOGRAM (OSH REPORT):       2/2016 PET (OSH REPORT):      3/2016 PET (OSH REPORT):      Assessment and Plan:   Mr. George is a 31 year old male who has a past medical history significant for SVT started at 1 mo age and then ultimately s/p ablation at 9 years old by Dr. Lynn, out of hospital [facility] on 8/6/15 from VT Storm work up negative s/p secondary prevention SubQ ICD 8/20/2015 with recurrent ICD shocks on 8/29/16, and October 2016 on Sotalol, imaging supporting cardiac sarcoidosis on steriods, and PE when hospitalized in 8/2015. His prednisone has been tapered down but he has continued on steroids since March 2016. His device interrogation today shows normal device function. He presents today for a second opinion on management of his VT and " cardiac sarcoidosis. Plans for EBUS biopsy tomorrow. Current cardiac medications include: Sotalol and Prednisone.     He initially presented with a VT storm and SCA in August 2015 and had relatively normal work up and underwent a secondary prevention SubQ ICD. He had early recurrence of VT and was placed on Sotalol 120 mg BID and was stable until October 2016 when he had 3 shocks from his ICD (2 in one event and a week later a single shock). His Sotalol dose was increased to 160 mg BID which he continues on today. Previous ECG appears to have stable QTc interval. He has not had any further arrhythmias. He has been on ongoing steroids which have been tapered but has continued since March 2016. Original PET showed active inflammation and repeat PET showed no inflammation. We discussed steroid-sparring therapies that may be needed long term to spare him the long term effects of corticosteroids and prevent steroid dependance. This will be managed by Dr. Pino. He will undergo EBUS biopsy tomorrow and a repeat PET in the future. We reviewed his most recent VT episodes which are monomorphic and likely scar related, less likely due to inflammation, although we have seen monomorphic VT due to inflammation but it is usually the exception. We would consider ablation for recurrences given he is on max Sotalol dose. We will have him follow up in a year or sooner if need arises.       The patient states understanding and is agreeable with plan.   This patient was seen and evaluated with Dr. Benjamin. The above note reflects our joint assessment and plan.   LIANA Reyes CNP  Pager: [phone]    EP STAFF NOTE  Patient seen and examined and management plan personally reviewed with the patient. I agree with the note above by Linda Mathews, EP Nurse. Changes in the physical examination, assessment and plan have been incorporated into the note by myself, as to make it a single cohesive document.  Nav Benjamin MD [facility]  RS  Cardiology - Electrophysiology    CC  ISAI SOSA

## 2017-01-17 NOTE — PATIENT INSTRUCTIONS
You were seen today in the Cardiovascular Clinic at the [facility].     Cardiology Providers you saw during your visit:  Dr. Pino    Recommendations:  Plan for biopsy tomorrow as already scheduled  Have a repeat PET scan done within the next month  Eat a heart healthy, low sodium diet.  Get 20 to 30 minutes of aerobic exercise 4 to 5 times per week as tolerated. (Examples of aerobic exercise include: walking, bicycling, swimming, running).    Follow-up:  Will be determined based on results of your heart rhythm monitor, biopsy and PET scan results    Results:      Orders Only on 01/17/2017   Component Date Value Ref Range Status     WBC 01/17/2017 9.5  4.0 - 11.0 10e9/L Final     RBC Count 01/17/2017 5.17  4.4 - 5.9 10e12/L Final     Hemoglobin 01/17/2017 16.4  13.3 - 17.7 g/dL Final     Hematocrit 01/17/2017 47.5  40.0 - 53.0 % Final     MCV 01/17/2017 92  78 - 100 fl Final     MCH 01/17/2017 31.7  26.5 - 33.0 pg Final     MCHC 01/17/2017 34.5  31.5 - 36.5 g/dL Final     RDW 01/17/2017 11.4  10.0 - 15.0 % Final     Platelet Count 01/17/2017 214  150 - 450 10e9/L Final     Diff Method 01/17/2017 Automated Method   Final     % Neutrophils 01/17/2017 82.7   Final     % Lymphocytes 01/17/2017 13.4   Final     % Monocytes 01/17/2017 3.1   Final     % Eosinophils 01/17/2017 0.1   Final     % Basophils 01/17/2017 0.2   Final     % Immature Granulocytes 01/17/2017 0.5   Final     Nucleated RBCs 01/17/2017 0  0 /100 Final     Absolute Neutrophil 01/17/2017 7.8  1.6 - 8.3 10e9/L Final     Absolute Lymphocytes 01/17/2017 1.3  0.8 - 5.3 10e9/L Final     Absolute Monocytes 01/17/2017 0.3  0.0 - 1.3 10e9/L Final     Absolute Eosinophils 01/17/2017 0.0  0.0 - 0.7 10e9/L Final     Absolute Basophils 01/17/2017 0.0  0.0 - 0.2 10e9/L Final     Abs Immature Granulocytes 01/17/2017 0.1  0 - 0.4 10e9/L Final     Absolute Nucleated RBC 01/17/2017 0.0   Final     Sodium 01/17/2017 141  133 - 144 mmol/L Final     Potassium  01/17/2017 5.0  3.4 - 5.3 mmol/L Final     Chloride 01/17/2017 105  94 - 109 mmol/L Final     Carbon Dioxide 01/17/2017 30  20 - 32 mmol/L Final     Anion Gap 01/17/2017 7  3 - 14 mmol/L Final     Glucose 01/17/2017 121* 70 - 99 mg/dL Final     Urea Nitrogen 01/17/2017 18  7 - 30 mg/dL Final     Creatinine 01/17/2017 0.98  0.66 - 1.25 mg/dL Final     GFR Estimate 01/17/2017 88  >60 mL/min/1.7m2 Final    Non  GFR Calc     GFR Estimate If Black 01/17/2017   >60 mL/min/1.7m2 Final                    Value:>90   GFR Calc       Calcium 01/17/2017 9.2  8.5 - 10.1 mg/dL Final     Bilirubin Total 01/17/2017 0.4  0.2 - 1.3 mg/dL Final     Albumin 01/17/2017 4.0  3.4 - 5.0 g/dL Final     Protein Total 01/17/2017 7.7  6.8 - 8.8 g/dL Final     Alkaline Phosphatase 01/17/2017 80  40 - 150 U/L Final     ALT 01/17/2017 45  0 - 70 U/L Final     AST 01/17/2017 17  0 - 45 U/L Final     N-Terminal Pro Bnp 01/17/2017 45  0 - 125 pg/mL Final    Comment: Reference range shown and results flagged as abnormal are for the outpatient,   non acute settings. Establishing a baseline value for each individual patient   is useful for follow-up.  Suggested inpatient cut points for confirming diagnosis of CHF in an acute   setting are:   >450 pg/mL (age 18 to less than 50)   >900 pg/mL (age 50 to less than 75)   >1800 pg/mL (75 yrs and older)  An inpatient or emergency department NT-proPBNP <300 pg/mL effectively rules   out   acute CHF, with 99% negative predictive value.       Troponin I ES 01/17/2017   0.000 - 0.045 ug/L Final                    Value:<0.015  The 99th percentile for upper reference range is 0.045 ug/L.  Troponin values in   the range of 0.045 - 0.120 ug/L may be associated with risks of adverse   clinical events.         For emergencies call 911.    For any scheduling needs, please call [phone], option #1 then option # 1.    Thank you for entrusting us with your care!     Please call if you  have any questions or concerns.    Jeanine Kearns RN  Cardiology Care Coordinator  [phone], press option # 1 to be routed to the [place] then option # 3 for medical questions to speak with a nurse    If you have an urgent need after business hours or over the weekend please call [phone] and ask for the cardiology fellow on call.     If you have a hard time paying for your medications visit http://www.needymeds.org/ to see where you might be able to get your medications the cheapest along with patient assistance programs available.    Heart failure patients and family members are welcome you to come to one of our heart failure support group meetings on the following dates from 1-2 pm :12/5/16. These all take place on the 8th floor of the our hospital building in the [facility] Conference Room. Let us know if you have any questions.

## 2017-01-17 NOTE — MR AVS SNAPSHOT
After Visit Summary   1/17/2017    Temo George    MRN: [id number]           Patient Information     Date Of Birth          1985        Visit Information        Provider Department      1/17/2017 3:00 PM Kaia Pino MD [facility]        Today's Diagnoses     Cardiac sarcoidosis (H)    -  1       Care Instructions    You were seen today in the Cardiovascular Clinic at the [facility].     Cardiology Providers you saw during your visit:  Dr. Pino    Recommendations:  Plan for biopsy tomorrow as already scheduled  Have a repeat PET scan done within the next month  Eat a heart healthy, low sodium diet.  Get 20 to 30 minutes of aerobic exercise 4 to 5 times per week as tolerated. (Examples of aerobic exercise include: walking, bicycling, swimming, running).    Follow-up:  Will be determined based on results of your heart rhythm monitor, biopsy and PET scan results    Results:      Orders Only on 01/17/2017   Component Date Value Ref Range Status     WBC 01/17/2017 9.5  4.0 - 11.0 10e9/L Final     RBC Count 01/17/2017 5.17  4.4 - 5.9 10e12/L Final     Hemoglobin 01/17/2017 16.4  13.3 - 17.7 g/dL Final     Hematocrit 01/17/2017 47.5  40.0 - 53.0 % Final     MCV 01/17/2017 92  78 - 100 fl Final     MCH 01/17/2017 31.7  26.5 - 33.0 pg Final     MCHC 01/17/2017 34.5  31.5 - 36.5 g/dL Final     RDW 01/17/2017 11.4  10.0 - 15.0 % Final     Platelet Count 01/17/2017 214  150 - 450 10e9/L Final     Diff Method 01/17/2017 Automated Method   Final     % Neutrophils 01/17/2017 82.7   Final     % Lymphocytes 01/17/2017 13.4   Final     % Monocytes 01/17/2017 3.1   Final     % Eosinophils 01/17/2017 0.1   Final     % Basophils 01/17/2017 0.2   Final     % Immature Granulocytes 01/17/2017 0.5   Final     Nucleated RBCs 01/17/2017 0  0 /100 Final     Absolute Neutrophil 01/17/2017 7.8  1.6 - 8.3 10e9/L Final     Absolute Lymphocytes 01/17/2017 1.3  0.8 - 5.3 10e9/L Final      Absolute Monocytes 01/17/2017 0.3  0.0 - 1.3 10e9/L Final     Absolute Eosinophils 01/17/2017 0.0  0.0 - 0.7 10e9/L Final     Absolute Basophils 01/17/2017 0.0  0.0 - 0.2 10e9/L Final     Abs Immature Granulocytes 01/17/2017 0.1  0 - 0.4 10e9/L Final     Absolute Nucleated RBC 01/17/2017 0.0   Final     Sodium 01/17/2017 141  133 - 144 mmol/L Final     Potassium 01/17/2017 5.0  3.4 - 5.3 mmol/L Final     Chloride 01/17/2017 105  94 - 109 mmol/L Final     Carbon Dioxide 01/17/2017 30  20 - 32 mmol/L Final     Anion Gap 01/17/2017 7  3 - 14 mmol/L Final     Glucose 01/17/2017 121* 70 - 99 mg/dL Final     Urea Nitrogen 01/17/2017 18  7 - 30 mg/dL Final     Creatinine 01/17/2017 0.98  0.66 - 1.25 mg/dL Final     GFR Estimate 01/17/2017 88  >60 mL/min/1.7m2 Final    Non  GFR Calc     GFR Estimate If Black 01/17/2017   >60 mL/min/1.7m2 Final                    Value:>90   GFR Calc       Calcium 01/17/2017 9.2  8.5 - 10.1 mg/dL Final     Bilirubin Total 01/17/2017 0.4  0.2 - 1.3 mg/dL Final     Albumin 01/17/2017 4.0  3.4 - 5.0 g/dL Final     Protein Total 01/17/2017 7.7  6.8 - 8.8 g/dL Final     Alkaline Phosphatase 01/17/2017 80  40 - 150 U/L Final     ALT 01/17/2017 45  0 - 70 U/L Final     AST 01/17/2017 17  0 - 45 U/L Final     N-Terminal Pro Bnp 01/17/2017 45  0 - 125 pg/mL Final    Comment: Reference range shown and results flagged as abnormal are for the outpatient,   non acute settings. Establishing a baseline value for each individual patient   is useful for follow-up.  Suggested inpatient cut points for confirming diagnosis of CHF in an acute   setting are:   >450 pg/mL (age 18 to less than 50)   >900 pg/mL (age 50 to less than 75)   >1800 pg/mL (75 yrs and older)  An inpatient or emergency department NT-proPBNP <300 pg/mL effectively rules   out   acute CHF, with 99% negative predictive value.       Troponin I ES 01/17/2017   0.000 - 0.045 ug/L Final                     Value:<0.015  The 99th percentile for upper reference range is 0.045 ug/L.  Troponin values in   the range of 0.045 - 0.120 ug/L may be associated with risks of adverse   clinical events.         For emergencies call 911.    For any scheduling needs, please call [phone], option #1 then option # 1.    Thank you for entrusting us with your care!     Please call if you have any questions or concerns.    Jeanine Kearns RN  Cardiology Care Coordinator  [phone], press option # 1 to be routed to the [place] then option # 3 for medical questions to speak with a nurse    If you have an urgent need after business hours or over the weekend please call [phone] and ask for the cardiology fellow on call.     If you have a hard time paying for your medications visit http://www.needymeds.org/ to see where you might be able to get your medications the cheapest along with patient assistance programs available.    Heart failure patients and family members are welcome you to come to one of our heart failure support group meetings on the following dates from 1-2 pm :12/5/16. These all take place on the 8th floor of the [facility] hospital building in the [facility] Cafeteria Conference Room. Let us know if you have any questions.          Follow-ups after your visit        Your next 10 appointments already scheduled     Jan 18, 2017   Procedure with Jose Antonio Hogue MD   [facility], [place], Same Day Surgery (--)    [address] [address]   [phone]            Jan 18, 2017 10:00 AM   Holter Monitor with UUEKGM   [facility] ([facility])    [address] [address]                 Future tests that were ordered for you today     Open Future Orders        Priority Expected Expires Ordered    PET Cardiac Viability Routine  1/17/2018 1/17/2017    PET Cardiac Perfusion Routine  1/17/2018 1/17/2017    Holter Monitor 48 hour - Adult Routine  1/17/2018  "1/17/2017            Who to contact     If you have questions or need follow up information about today's clinic visit or your schedule please contact [facility] directly at [phone].  Normal or non-critical lab and imaging results will be communicated to you by MyChart, letter or phone within 4 business days after the clinic has received the results. If you do not hear from us within 7 days, please contact the clinic through [employer] or phone. If you have a critical or abnormal lab result, we will notify you by phone as soon as possible.  Submit refill requests through [employer] or call your pharmacy and they will forward the refill request to us. Please allow 3 business days for your refill to be completed.          Additional Information About Your Visit        [employer] for [employer] Information     [employer] gives you secure access to your electronic health record. If you see a primary care provider, you can also send messages to your care team and make appointments. If you have questions, please call your primary care clinic.  If you do not have a primary care provider, please call [phone] and they will assist you.        Care EveryWhere ID     This is your Care EveryWhere ID. This could be used by other organizations to access your [place] medical records  [id number]        Your Vitals Were     Pulse Height BMI (Body Mass Index) Pulse Oximetry          83 1.854 m (6' 1\") 32.86 kg/m2 95%         Blood Pressure from Last 3 Encounters:   01/17/17 123/75   01/17/17 123/75    Weight from Last 3 Encounters:   01/17/17 112.946 kg (249 lb)   01/17/17 113.127 kg (249 lb 6.4 oz)               Primary Care Provider    [facility] Other Clinic                Thank you!     Thank you for choosing [facility]  for your care. Our goal is always to provide you with excellent care. Hearing back from our patients is one way we can continue to improve our services. Please take a few minutes to complete the written survey that " you may receive in the mail after your visit with us. Thank you!             Your Updated Medication List - Protect others around you: Learn how to safely use, store and throw away your medicines at www.disposemymeds.org.          This list is accurate as of: 1/17/17  4:44 PM.  Always use your most recent med list.                   Brand Name Dispense Instructions for use    BACTRIM -160 MG per tablet   Generic drug:  sulfamethoxazole-trimethoprim          MAGNESIUM PO      Magnesium Taurate 400 mg tab. 1 tablet by mouth two times a day.       omeprazole 20 MG CR capsule    priLOSEC         PREDNISONE PO      Take 20 mg by mouth daily       sotalol 160 MG tablet    BETAPACE     Take 160 mg by mouth 2 times daily

## 2017-01-17 NOTE — MR AVS SNAPSHOT
After Visit Summary   1/17/2017    Temo George    MRN: [id number]           Patient Information     Date Of Birth          1985        Visit Information        Provider Department      1/17/2017 1:30 PM Nav Benjamin MD [facility]        Today's Diagnoses     Cardiac sarcoidosis (H)           Care Instructions    Cardiology Provider you saw in clinic today: Dr. Benjamin    Tests Needed:   1. 48 hour 12 lead holter      Follow-up Visit:  1 year with device check prior        You will receive all normal lab and testing results via MyChart or mail if not reviewed in clinic today. Please contact our office if you need assistance with setting up MyChart.    If you need a medication refill please contact your pharmacy. Please allow 3 business days for your refill to be completed.     As always, thank you for trusting us with your health care needs!    If you have any questions regarding your visit please contact your care team:   Cardiology  Telephone Number    Julienne Bynum RN  Electrophysiology Nurse Coordinator [phone]     Call for EP procedure scheduling concerns  Bev Pacheco   EP  [phone]           Device Clinic (Pacemakers, ICDs, Loop)   RN's : Linda Mijares Connie, Dawn During business hours: [phone]    After business hours:   [phone]- select option 4 and ask for job code [id number].                  Follow-ups after your visit        Follow-up notes from your care team     Return in about 1 year (around 1/17/2018) for VT mgmt, Dr. Benjamin.      Your next 10 appointments already scheduled     Jan 18, 2017   Procedure with Jose Antonio Hogue MD   [facility], [place], Same Day Surgery (--)    [address] [address] [phone]            Jan 18, 2017 10:00 AM   Holter Monitor with UUEKGM   [facility] ([facility])    [address] [address]                 Future tests that were  "ordered for you today     Open Future Orders        Priority Expected Expires Ordered    PET Cardiac Viability Routine  1/17/2018 1/17/2017    PET Cardiac Perfusion Routine  1/17/2018 1/17/2017    Holter Monitor 48 hour - Adult Routine  1/17/2018 1/17/2017            Who to contact     If you have questions or need follow up information about today's clinic visit or your schedule please contact [facility] directly at [phone].  Normal or non-critical lab and imaging results will be communicated to you by [employer], letter or phone within 4 business days after the clinic has received the results. If you do not hear from us within 7 days, please contact the clinic through [employer] or phone. If you have a critical or abnormal lab result, we will notify you by phone as soon as possible.  Submit refill requests through [employer] or call your pharmacy and they will forward the refill request to us. Please allow 3 business days for your refill to be completed.          Additional Information About Your Visit        O[employer] Information     [employer] gives you secure access to your electronic health record. If you see a primary care provider, you can also send messages to your care team and make appointments. If you have questions, please call your primary care clinic.  If you do not have a primary care provider, please call [phone] and they will assist you.        Care EveryWhere ID     This is your Care EveryWhere ID. This could be used by other organizations to access your [place] medical records  [id number]        Your Vitals Were     Pulse Height BMI (Body Mass Index) Pulse Oximetry          83 1.854 m (6' 1\") 32.91 kg/m2 95%         Blood Pressure from Last 3 Encounters:   01/17/17 123/75   01/17/17 123/75    Weight from Last 3 Encounters:   01/17/17 112.946 kg (249 lb)   01/17/17 113.127 kg (249 lb 6.4 oz)              We Performed the Following     EKG 12-lead complete w/read - Clinics     EKG 12-lead, " tracing only        Primary Care Provider    Md Other Clinic                Thank you!     Thank you for choosing [facility]  for your care. Our goal is always to provide you with excellent care. Hearing back from our patients is one way we can continue to improve our services. Please take a few minutes to complete the written survey that you may receive in the mail after your visit with us. Thank you!             Your Updated Medication List - Protect others around you: Learn how to safely use, store and throw away your medicines at www.disposemymeds.org.          This list is accurate as of: 1/17/17  4:26 PM.  Always use your most recent med list.                   Brand Name Dispense Instructions for use    BACTRIM -160 MG per tablet   Generic drug:  sulfamethoxazole-trimethoprim          MAGNESIUM PO      Magnesium Taurate 400 mg tab. 1 tablet by mouth two times a day.       omeprazole 20 MG CR capsule    priLOSEC         PREDNISONE PO      Take 20 mg by mouth daily       sotalol 160 MG tablet    BETAPACE     Take 160 mg by mouth 2 times daily

## 2017-01-17 NOTE — PROGRESS NOTES
Quick Note:    Results discussed directly with patient while patient was present. Any further details documented in the note.     Janelle Kearns RN  ______

## 2017-01-18 ENCOUNTER — HOSPITAL ENCOUNTER (OUTPATIENT)
Dept: CARDIOLOGY | Facility: CLINIC | Age: 32
Discharge: HOME | End: 2017-01-18
Attending: INTERNAL MEDICINE | Admitting: INTERNAL MEDICINE
Payer: COMMERCIAL

## 2017-01-18 ENCOUNTER — HOSPITAL ENCOUNTER (OUTPATIENT)
Facility: CLINIC | Age: 32
Discharge: HOME | End: 2017-01-18
Attending: INTERNAL MEDICINE | Admitting: INTERNAL MEDICINE
Payer: COMMERCIAL

## 2017-01-18 ENCOUNTER — SURGERY (OUTPATIENT)
Age: 32
End: 2017-01-18

## 2017-01-18 ENCOUNTER — ANESTHESIA (OUTPATIENT)
Dept: SURGERY | Facility: CLINIC | Age: 32
End: 2017-01-18
Payer: COMMERCIAL

## 2017-01-18 VITALS
BODY MASS INDEX: 33.28 KG/M2 | TEMPERATURE: 97.4 F | SYSTOLIC BLOOD PRESSURE: 107 MMHG | HEIGHT: 73 IN | DIASTOLIC BLOOD PRESSURE: 81 MMHG | RESPIRATION RATE: 18 BRPM | OXYGEN SATURATION: 98 % | WEIGHT: 251.1 LBS

## 2017-01-18 DIAGNOSIS — D86.85 CARDIAC SARCOIDOSIS: ICD-10-CM

## 2017-01-18 LAB
APPEARANCE FLD: NORMAL
BASOPHILS NFR FLD MANUAL: 1 %
CD19 CELLS NFR BRONCH: 3 %
CD3 CELLS NFR BRONCH: 95 %
CD3+CD4+ CELLS NFR BRONCH: 55 %
CD3+CD4+ CELLS/CD3+CD8+ CLL BRONCH: 1.41 %
CD3+CD8+ CELLS NFR BRONCH: 39 %
CD3-CD16+CD56+ CELLS NFR SPEC: 2 %
COLOR FLD: NORMAL
COPATH REPORT: NORMAL
EOSINOPHIL NFR FLD MANUAL: 1 %
GRAM STN SPEC: NORMAL
GRAM STN SPEC: NORMAL
IFC SPECIMEN: NORMAL
LYMPHOCYTES NFR FLD MANUAL: 31 %
MICRO REPORT STATUS: NORMAL
MICRO REPORT STATUS: NORMAL
NEUTS BAND NFR FLD MANUAL: 5 %
OTHER CELLS FLD MANUAL: 62 %
RBC # FLD: NORMAL /UL
SPECIMEN SOURCE FLD: NORMAL
SPECIMEN SOURCE: NORMAL
SPECIMEN SOURCE: NORMAL
T-CELL SUBSET INTERPRETATION: NORMAL
WBC # FLD AUTO: 163 /UL

## 2017-01-18 PROCEDURE — 87116 MYCOBACTERIA CULTURE: CPT | Performed by: INTERNAL MEDICINE

## 2017-01-18 PROCEDURE — 25000128 H RX IP 250 OP 636: Performed by: STUDENT IN AN ORGANIZED HEALTH CARE EDUCATION/TRAINING PROGRAM

## 2017-01-18 PROCEDURE — 88108 CYTOPATH CONCENTRATE TECH: CPT | Performed by: INTERNAL MEDICINE

## 2017-01-18 PROCEDURE — 40000170 ZZH STATISTIC PRE-PROCEDURE ASSESSMENT II: Performed by: INTERNAL MEDICINE

## 2017-01-18 PROCEDURE — 89051 BODY FLUID CELL COUNT: CPT | Performed by: INTERNAL MEDICINE

## 2017-01-18 PROCEDURE — 25000125 ZZHC RX 250: Performed by: STUDENT IN AN ORGANIZED HEALTH CARE EDUCATION/TRAINING PROGRAM

## 2017-01-18 PROCEDURE — 37000008 ZZH ANESTHESIA TECHNICAL FEE, 1ST 30 MIN: Performed by: INTERNAL MEDICINE

## 2017-01-18 PROCEDURE — 87070 CULTURE OTHR SPECIMN AEROBIC: CPT | Performed by: INTERNAL MEDICINE

## 2017-01-18 PROCEDURE — 87102 FUNGUS ISOLATION CULTURE: CPT | Performed by: INTERNAL MEDICINE

## 2017-01-18 PROCEDURE — 25000566 ZZH SEVOFLURANE, EA 15 MIN: Performed by: INTERNAL MEDICINE

## 2017-01-18 PROCEDURE — 88173 CYTOPATH EVAL FNA REPORT: CPT | Performed by: INTERNAL MEDICINE

## 2017-01-18 PROCEDURE — 86359 T CELLS TOTAL COUNT: CPT | Performed by: INTERNAL MEDICINE

## 2017-01-18 PROCEDURE — 37000009 ZZH ANESTHESIA TECHNICAL FEE, EACH ADDTL 15 MIN: Performed by: INTERNAL MEDICINE

## 2017-01-18 PROCEDURE — 36000064 ZZH SURGERY LEVEL 4 EA 15 ADDTL MIN - UMMC: Performed by: INTERNAL MEDICINE

## 2017-01-18 PROCEDURE — 27210794 ZZH OR GENERAL SUPPLY STERILE: Performed by: INTERNAL MEDICINE

## 2017-01-18 PROCEDURE — 93227 XTRNL ECG REC<48 HR R&I: CPT | Performed by: INTERNAL MEDICINE

## 2017-01-18 PROCEDURE — 71000027 ZZH RECOVERY PHASE 2 EACH 15 MINS: Performed by: INTERNAL MEDICINE

## 2017-01-18 PROCEDURE — 88305 TISSUE EXAM BY PATHOLOGIST: CPT | Performed by: INTERNAL MEDICINE

## 2017-01-18 PROCEDURE — 00000155 ZZHCL STATISTIC H-CELL BLOCK W/STAIN: Performed by: INTERNAL MEDICINE

## 2017-01-18 PROCEDURE — 00000467 ZZHCL STATISTIC CYTO FNA IM TC STAT 88172: Performed by: INTERNAL MEDICINE

## 2017-01-18 PROCEDURE — 87206 SMEAR FLUORESCENT/ACID STAI: CPT | Performed by: INTERNAL MEDICINE

## 2017-01-18 PROCEDURE — 87205 SMEAR GRAM STAIN: CPT | Performed by: INTERNAL MEDICINE

## 2017-01-18 PROCEDURE — 25800025 ZZH RX 258: Performed by: STUDENT IN AN ORGANIZED HEALTH CARE EDUCATION/TRAINING PROGRAM

## 2017-01-18 PROCEDURE — 36000062 ZZH SURGERY LEVEL 4 1ST 30 MIN - UMMC: Performed by: INTERNAL MEDICINE

## 2017-01-18 PROCEDURE — 71000012 ZZH RECOVERY PHASE 1 LEVEL 1 FIRST HR: Performed by: INTERNAL MEDICINE

## 2017-01-18 PROCEDURE — 88312 SPECIAL STAINS GROUP 1: CPT | Performed by: INTERNAL MEDICINE

## 2017-01-18 RX ORDER — FENTANYL CITRATE 50 UG/ML
25-50 INJECTION, SOLUTION INTRAMUSCULAR; INTRAVENOUS EVERY 5 MIN PRN
Status: DISCONTINUED | OUTPATIENT
Start: 2017-01-18 | End: 2017-01-18 | Stop reason: HOSPADM

## 2017-01-18 RX ORDER — KETOROLAC TROMETHAMINE 30 MG/ML
30 INJECTION, SOLUTION INTRAMUSCULAR; INTRAVENOUS EVERY 6 HOURS PRN
Status: DISCONTINUED | OUTPATIENT
Start: 2017-01-18 | End: 2017-01-18 | Stop reason: HOSPADM

## 2017-01-18 RX ORDER — PHYSOSTIGMINE SALICYLATE 1 MG/ML
1.2 INJECTION INTRAVENOUS
Status: DISCONTINUED | OUTPATIENT
Start: 2017-01-18 | End: 2017-01-18 | Stop reason: HOSPADM

## 2017-01-18 RX ORDER — NALOXONE HYDROCHLORIDE 0.4 MG/ML
.1-.4 INJECTION, SOLUTION INTRAMUSCULAR; INTRAVENOUS; SUBCUTANEOUS
Status: DISCONTINUED | OUTPATIENT
Start: 2017-01-18 | End: 2017-01-18 | Stop reason: HOSPADM

## 2017-01-18 RX ORDER — HYDRALAZINE HYDROCHLORIDE 20 MG/ML
2.5-5 INJECTION INTRAMUSCULAR; INTRAVENOUS EVERY 10 MIN PRN
Status: DISCONTINUED | OUTPATIENT
Start: 2017-01-18 | End: 2017-01-18 | Stop reason: HOSPADM

## 2017-01-18 RX ORDER — NEOSTIGMINE METHYLSULFATE 1 MG/ML
VIAL (ML) INJECTION PRN
Status: DISCONTINUED | OUTPATIENT
Start: 2017-01-18 | End: 2017-01-18

## 2017-01-18 RX ORDER — SODIUM CHLORIDE, SODIUM LACTATE, POTASSIUM CHLORIDE, CALCIUM CHLORIDE 600; 310; 30; 20 MG/100ML; MG/100ML; MG/100ML; MG/100ML
INJECTION, SOLUTION INTRAVENOUS CONTINUOUS PRN
Status: DISCONTINUED | OUTPATIENT
Start: 2017-01-18 | End: 2017-01-18

## 2017-01-18 RX ORDER — FENTANYL CITRATE 50 UG/ML
INJECTION, SOLUTION INTRAMUSCULAR; INTRAVENOUS PRN
Status: DISCONTINUED | OUTPATIENT
Start: 2017-01-18 | End: 2017-01-18

## 2017-01-18 RX ORDER — ALBUTEROL SULFATE 0.83 MG/ML
2.5 SOLUTION RESPIRATORY (INHALATION) EVERY 4 HOURS PRN
Status: DISCONTINUED | OUTPATIENT
Start: 2017-01-18 | End: 2017-01-18 | Stop reason: HOSPADM

## 2017-01-18 RX ORDER — ONDANSETRON 4 MG/1
4 TABLET, ORALLY DISINTEGRATING ORAL EVERY 30 MIN PRN
Status: DISCONTINUED | OUTPATIENT
Start: 2017-01-18 | End: 2017-01-18 | Stop reason: HOSPADM

## 2017-01-18 RX ORDER — FENTANYL CITRATE 50 UG/ML
25-50 INJECTION, SOLUTION INTRAMUSCULAR; INTRAVENOUS
Status: DISCONTINUED | OUTPATIENT
Start: 2017-01-18 | End: 2017-01-18 | Stop reason: HOSPADM

## 2017-01-18 RX ORDER — PROMETHAZINE HYDROCHLORIDE 25 MG/ML
12.5 INJECTION, SOLUTION INTRAMUSCULAR; INTRAVENOUS
Status: DISCONTINUED | OUTPATIENT
Start: 2017-01-18 | End: 2017-01-18 | Stop reason: HOSPADM

## 2017-01-18 RX ORDER — DEXAMETHASONE SODIUM PHOSPHATE 4 MG/ML
INJECTION, SOLUTION INTRA-ARTICULAR; INTRALESIONAL; INTRAMUSCULAR; INTRAVENOUS; SOFT TISSUE PRN
Status: DISCONTINUED | OUTPATIENT
Start: 2017-01-18 | End: 2017-01-18

## 2017-01-18 RX ORDER — MEPERIDINE HYDROCHLORIDE 25 MG/ML
12.5 INJECTION INTRAMUSCULAR; INTRAVENOUS; SUBCUTANEOUS
Status: DISCONTINUED | OUTPATIENT
Start: 2017-01-18 | End: 2017-01-18 | Stop reason: HOSPADM

## 2017-01-18 RX ORDER — DIMENHYDRINATE 50 MG/ML
25 INJECTION, SOLUTION INTRAMUSCULAR; INTRAVENOUS
Status: DISCONTINUED | OUTPATIENT
Start: 2017-01-18 | End: 2017-01-18 | Stop reason: HOSPADM

## 2017-01-18 RX ORDER — PROPOFOL 10 MG/ML
INJECTION, EMULSION INTRAVENOUS CONTINUOUS PRN
Status: DISCONTINUED | OUTPATIENT
Start: 2017-01-18 | End: 2017-01-18

## 2017-01-18 RX ORDER — LORAZEPAM 2 MG/ML
.5-1 INJECTION INTRAMUSCULAR
Status: DISCONTINUED | OUTPATIENT
Start: 2017-01-18 | End: 2017-01-18 | Stop reason: HOSPADM

## 2017-01-18 RX ORDER — ONDANSETRON 2 MG/ML
INJECTION INTRAMUSCULAR; INTRAVENOUS PRN
Status: DISCONTINUED | OUTPATIENT
Start: 2017-01-18 | End: 2017-01-18

## 2017-01-18 RX ORDER — EPHEDRINE SULFATE 50 MG/ML
INJECTION, SOLUTION INTRAMUSCULAR; INTRAVENOUS; SUBCUTANEOUS PRN
Status: DISCONTINUED | OUTPATIENT
Start: 2017-01-18 | End: 2017-01-18

## 2017-01-18 RX ORDER — ONDANSETRON 2 MG/ML
4 INJECTION INTRAMUSCULAR; INTRAVENOUS EVERY 30 MIN PRN
Status: DISCONTINUED | OUTPATIENT
Start: 2017-01-18 | End: 2017-01-18 | Stop reason: HOSPADM

## 2017-01-18 RX ORDER — LIDOCAINE HYDROCHLORIDE 20 MG/ML
INJECTION, SOLUTION INFILTRATION; PERINEURAL PRN
Status: DISCONTINUED | OUTPATIENT
Start: 2017-01-18 | End: 2017-01-18

## 2017-01-18 RX ORDER — DROPERIDOL 2.5 MG/ML
0.62 INJECTION, SOLUTION INTRAMUSCULAR; INTRAVENOUS
Status: DISCONTINUED | OUTPATIENT
Start: 2017-01-18 | End: 2017-01-18 | Stop reason: RX

## 2017-01-18 RX ORDER — ACETAMINOPHEN 650 MG/1
650 SUPPOSITORY RECTAL EVERY 4 HOURS PRN
Status: DISCONTINUED | OUTPATIENT
Start: 2017-01-18 | End: 2017-01-18 | Stop reason: HOSPADM

## 2017-01-18 RX ORDER — HYDROMORPHONE HYDROCHLORIDE 1 MG/ML
.3-.5 INJECTION, SOLUTION INTRAMUSCULAR; INTRAVENOUS; SUBCUTANEOUS EVERY 10 MIN PRN
Status: DISCONTINUED | OUTPATIENT
Start: 2017-01-18 | End: 2017-01-18 | Stop reason: HOSPADM

## 2017-01-18 RX ORDER — PROPOFOL 10 MG/ML
INJECTION, EMULSION INTRAVENOUS PRN
Status: DISCONTINUED | OUTPATIENT
Start: 2017-01-18 | End: 2017-01-18

## 2017-01-18 RX ORDER — GLYCOPYRROLATE 0.2 MG/ML
INJECTION, SOLUTION INTRAMUSCULAR; INTRAVENOUS PRN
Status: DISCONTINUED | OUTPATIENT
Start: 2017-01-18 | End: 2017-01-18

## 2017-01-18 RX ADMIN — DEXAMETHASONE SODIUM PHOSPHATE 10 MG: 4 INJECTION, SOLUTION INTRAMUSCULAR; INTRAVENOUS at 08:28

## 2017-01-18 RX ADMIN — GLYCOPYRROLATE 0.2 MG: 0.2 INJECTION, SOLUTION INTRAMUSCULAR; INTRAVENOUS at 08:21

## 2017-01-18 RX ADMIN — SODIUM CHLORIDE, POTASSIUM CHLORIDE, SODIUM LACTATE AND CALCIUM CHLORIDE: 600; 310; 30; 20 INJECTION, SOLUTION INTRAVENOUS at 07:47

## 2017-01-18 RX ADMIN — Medication 5 MG: at 09:02

## 2017-01-18 RX ADMIN — Medication 7.5 MG: at 09:15

## 2017-01-18 RX ADMIN — ONDANSETRON 4 MG: 2 INJECTION INTRAMUSCULAR; INTRAVENOUS at 08:56

## 2017-01-18 RX ADMIN — GLYCOPYRROLATE 0.8 MG: 0.2 INJECTION, SOLUTION INTRAMUSCULAR; INTRAVENOUS at 09:02

## 2017-01-18 RX ADMIN — FENTANYL CITRATE 100 MCG: 50 INJECTION, SOLUTION INTRAMUSCULAR; INTRAVENOUS at 08:44

## 2017-01-18 RX ADMIN — ROCURONIUM BROMIDE 50 MG: 10 INJECTION INTRAVENOUS at 08:27

## 2017-01-18 RX ADMIN — MIDAZOLAM HYDROCHLORIDE 2 MG: 1 INJECTION, SOLUTION INTRAMUSCULAR; INTRAVENOUS at 08:32

## 2017-01-18 RX ADMIN — PROPOFOL 200 MCG/KG/MIN: 10 INJECTION, EMULSION INTRAVENOUS at 08:37

## 2017-01-18 RX ADMIN — GLYCOPYRROLATE 0.2 MG: 0.2 INJECTION, SOLUTION INTRAMUSCULAR; INTRAVENOUS at 09:18

## 2017-01-18 RX ADMIN — FENTANYL CITRATE 100 MCG: 50 INJECTION, SOLUTION INTRAMUSCULAR; INTRAVENOUS at 08:26

## 2017-01-18 RX ADMIN — PROPOFOL 200 MG: 10 INJECTION, EMULSION INTRAVENOUS at 08:26

## 2017-01-18 RX ADMIN — LIDOCAINE HYDROCHLORIDE 100 MG: 20 INJECTION, SOLUTION INFILTRATION; PERINEURAL at 08:24

## 2017-01-18 RX ADMIN — PHENYLEPHRINE HYDROCHLORIDE 100 MCG: 10 INJECTION, SOLUTION INTRAMUSCULAR; INTRAVENOUS; SUBCUTANEOUS at 08:54

## 2017-01-18 ASSESSMENT — LIFESTYLE VARIABLES: TOBACCO_USE: 0

## 2017-01-18 ASSESSMENT — ENCOUNTER SYMPTOMS
SEIZURES: 0
DYSRHYTHMIAS: 1

## 2017-01-18 NOTE — ANESTHESIA CARE TRANSFER NOTE
Patient: Temo George    BRONCHOSCOPY RIGID OR FLEXIBLE W/TRANSENDOSCOPIC ENDOBRONCHIAL ULTRASOUND GUIDED (N/A Bronchus)  Additional InformationProcedure(s):  3D Flexible Bronchoscopy, Endobronchial Ultrasound, bronchial lavage, endobronchial biopsies  - Wound Class: II-Clean Contaminated    Diagnosis: Mediastinal Adenopathy   Diagnosis Additional Information: No value filed.    Anesthesia Type:   General, ETT     Note:  Airway :Face Mask  Patient transferred to:PACU  Comments: VSS on arrival, report to RN.       Vitals: (Last set prior to Anesthesia Care Transfer)              Electronically Signed By: LIANA Guy CRNA  January 18, 2017  9:30 AM

## 2017-01-18 NOTE — PROGRESS NOTES
Patient is being discharged to [facility] WAITING ROOM for his holter monitor apt. Waiting on transport at this time.

## 2017-01-18 NOTE — IP AVS SNAPSHOT
MRN:[id number]                      After Visit Summary   1/18/2017    Temo George    MRN: [id number]           Thank you!     Thank you for choosing [facility] for your care. Our goal is always to provide you with excellent care. Hearing back from our patients is one way we can continue to improve our services. Please take a few minutes to complete the written survey that you may receive in the mail after you visit with us. Thank you!        Patient Information     Date Of Birth          1985        About your hospital stay     You were admitted on:  January 18, 2017 You last received care in the:  Post Anesthesia Care Unit [facility]    You were discharged on:  January 18, 2017       Who to Call     For medical emergencies, please call 911.  For non-urgent questions about your medical care, please call your primary care provider or clinic, None  For questions related to your surgery, please call your surgery clinic        Attending Provider     Provider    Jose Antonio Hogue MD       Primary Care Provider    Md Other Clinic                Further instructions from your care team       [facility]  Same-Day Surgery   Adult Discharge Orders & Instructions     For 24 hours after surgery    1. Get plenty of rest.  A responsible adult must stay with you for at least 24 hours after you leave the hospital.   2. Do not drive or use heavy equipment.  If you have weakness or tingling, don't drive or use heavy equipment until this feeling goes away.  3. Do not drink alcohol.  4. Avoid strenuous or risky activities.  Ask for help when climbing stairs.   5. You may feel lightheaded.  IF so, sit for a few minutes before standing.  Have someone help you get up.   6. If you have nausea (feel sick to your stomach): Drink only clear liquids such as apple juice, ginger ale, broth or 7-Up.  Rest may also help.  Be sure to drink enough fluids.  Move to a regular  "diet as you feel able.  7. You may have a slight fever. Call the doctor if your fever is over 100 F (37.7 C) (taken under the tongue) or lasts longer than 24 hours.  8. You may have a dry mouth, a sore throat, muscle aches or trouble sleeping.  These should go away after 24 hours.  9. Do not make important or legal decisions.   Call your doctor for any of the followin.  Signs of infection (fever, growing tenderness at the surgery site, a large amount of drainage or bleeding, severe pain, foul-smelling drainage, redness, swelling).    2. It has been over 8 to 10 hours since surgery and you are still not able to urinate (pass water).    3.  Headache for over 24 hours.    To contact a doctor, call Dr. Hogue's office at [phone] or:        [phone] and ask for the resident on call for PULMONARY CARE (answered 24 hours a day)      Emergency Department:    [facility]: [phone]       (TTY for hearing impaired: [phone])              Pending Results     Date and Time Order Name Status Description    2017 0915 Surgical pathology exam In process     2017 0915 Cytology non gyn In process     2017 0902 Gram stain In process     2017 0902 Cell count with differential fluid In process     2017 0000 EKG CARDIAC - HIM SCAN Preliminary             Admission Information        Provider Department Dept Phone    2017 Jose Antonio Hogue MD [facility] [phone]      Your Vitals Were     Blood Pressure Temperature Respirations    93/83 mmHg 97.9  F (36.6  C) (Oral) 16    Height Weight BMI (Body Mass Index)    1.854 m (6' 1\") 113.9 kg (251 lb 1.7 oz) 33.14 kg/m2    Pulse Oximetry          97%        MyChart Information     [employer] gives you secure access to your electronic health record. If you see a primary care provider, you can also send messages to your care team and make appointments. If you have questions, please call your primary care clinic.  If you do not have a " primary care provider, please call [phone] and they will assist you.        Care EveryWhere ID     This is your Care EveryWhere ID. This could be used by other organizations to access your [place] medical records  [id number]           Review of your medicines      CONTINUE these medicines which have NOT CHANGED        Dose / Directions    BACTRIM -160 MG per tablet   Generic drug:  sulfamethoxazole-trimethoprim        Refills:  0       MAGNESIUM PO        Magnesium Taurate 400 mg tab. 1 tablet by mouth two times a day.   Refills:  0       omeprazole 20 MG CR capsule   Commonly known as:  priLOSEC        Refills:  0       PREDNISONE PO        Dose:  20 mg   Take 20 mg by mouth daily   Refills:  0       sotalol 160 MG tablet   Commonly known as:  BETAPACE        Dose:  160 mg   Take 160 mg by mouth 2 times daily   Refills:  0                Protect others around you: Learn how to safely use, store and throw away your medicines at www.disposemymeds.org.             Medication List: This is a list of all your medications and when to take them. Check marks below indicate your daily home schedule. Keep this list as a reference.      Medications           Morning Afternoon Evening Bedtime As Needed    BACTRIM -160 MG per tablet   Generic drug:  sulfamethoxazole-trimethoprim                                MAGNESIUM PO   Magnesium Taurate 400 mg tab. 1 tablet by mouth two times a day.                                omeprazole 20 MG CR capsule   Commonly known as:  priLOSEC                                PREDNISONE PO   Take 20 mg by mouth daily                                sotalol 160 MG tablet   Commonly known as:  BETAPACE   Take 160 mg by mouth 2 times daily

## 2017-01-18 NOTE — PROCEDURES
Procedure(s):    Bronchoscopy  Bronchalveolar Lavage (1 sites, see below for details)  Endobronchial ultrasound / EBUS (1 sites biopsied, see below for details)  Endobronchial biopsies (1 sites biopsied, see below for details)    Indication:  Cardiac sarcoidosis    Attending of Record:     RADHA Hogue MD    Trainees Present:   Hilario Wetzel MD     Medications:  General Anesthesia - See anesthesia flowsheet for details    Time Out:  Performed    The patient's medical record has been reviewed.  The indication for the procedure was reviewed.  The necessary history and physical examination was performed and reviewed.  The risks, benefits and alternatives of the procedure were discussed with the the patient in detail and he had the opportunity to ask questions.  All questions were answered to the best of my ability.  Verbal and written informed consent was obtained.  The proposed procedure and the patient's identification were verified prior to the procedure by the physician and the nurse  respiratory therapist  technician  resident physician (resident / fellow)  surgical team.    The patient was assessed for the adequacy for the procedure and to receive medications.   Mental Status:  Alert and oriented x 3  Airway examination:  Class I (Complete visualization of soft palate)  Pulmonary:  Clear to ausculation bilaterally  CV:  RRR, no murmurs or gallops  ASA Grade:  (I)  Completely healthy fit patient    After clinical evaluation and reviewing the indication, risks, alternatives and benefits of the procedure the patient was deemed to be in satisfactory condition to undergo the procedure.        Maneuvers / Procedure:     The bronchoscope was inserted through the mouth via ETT.      A complete airway examination was performed from the distal trachea to the subsegmental level in each lobe of both lungs with exceptions/pertinent findings noted as follows    Endobronchial Examination: normal  Mucosal Examination:  normal  Secretions: minimal    BAL:  A bronchoalveolar lavage was performed.  The scope was wedged in the RML Lateral and then 120 ml of normal saline was instilled.  Gentle suction was then applied with a total return of 45 ml of fluid.      Endobronchial Biopsies:  One Site - The bronchoscope was inserted.  Topical epinephrine was administered.  The biopsy forceps were introduced and endobronchial biopsies were obtained from RC2.  A total of 4 biopsies were obtained.  EBUS:  The EBUS scope was inserted and biopsies were obtained from Station 12L with 5 passes, 5 samples obtained with KAIN present, a combination of suction and no suction was used to obtain the samples.  EBUS samples were sent for cytology.    Pertinent Images / special notes:     12L        The bronchoscope(s) used were immediately inspected following the procedure.  Any disposable equipment was visually inspected and deemed to be intact immediately post procedure.      Recommendations:     -->  Successful BAL, endobronchial biopsy and EBUS-TBNA  -->  Await pathology results       Hilario Wetzel MD  Pulmonary, Allergy and Critical Care Medicine  [facility]  [phone]

## 2017-01-18 NOTE — ANESTHESIA POSTPROCEDURE EVALUATION
Patient: Temo George    BRONCHOSCOPY RIGID OR FLEXIBLE W/TRANSENDOSCOPIC ENDOBRONCHIAL ULTRASOUND GUIDED (N/A Bronchus)  Additional InformationProcedure(s):  3D Flexible Bronchoscopy, Endobronchial Ultrasound, bronchial lavage, endobronchial biopsies  - Wound Class: II-Clean Contaminated    Diagnosis:Mediastinal Adenopathy   Diagnosis Additional Information: No value filed.    Anesthesia Type:  General, ETT    Note:  Anesthesia Post Evaluation    Patient location during evaluation: bedside  Patient participation: Able to fully participate in evaluation  Level of consciousness: awake and alert  Pain management: adequate  Airway patency: patent  Cardiovascular status: acceptable, blood pressure returned to baseline and stable  Respiratory status: acceptable and spontaneous ventilation  Hydration status: acceptable  PONV: none     Anesthetic complications: None    Comments: Carlos Celaya MD  Staff Anesthesiologist  Phone: [phone]  January 18, 2017          Last vitals:  Filed Vitals:    01/18/17 0930 01/18/17 0940 01/18/17 0945   BP: 110/55 105/56    Temp:      Resp:      SpO2: 100% 100% 97%       Electronically Signed By: Carlos Celaya MD  January 18, 2017

## 2017-01-18 NOTE — OR NURSING
Patient asking about what medications to take preop. Dr Garcia Ocasio came and stated to give the betablocker and the prilosec

## 2017-01-18 NOTE — IP AVS SNAPSHOT
Post Anesthesia Care Unit [address] [address]    Phone:  [phone]                                       After Visit Summary   1/18/2017    Temo George    MRN: [id number]           After Visit Summary Signature Page     I have received my discharge instructions, and my questions have been answered. I have discussed any challenges I see with this plan with the nurse or doctor.    ..........................................................................................................................................  Patient/Patient Representative Signature      ..........................................................................................................................................  Patient Representative Print Name and Relationship to Patient    ..................................................               ................................................  Date                                            Time    ..........................................................................................................................................  Reviewed by Signature/Title    ...................................................              ..............................................  Date                                                            Time

## 2017-01-18 NOTE — DISCHARGE INSTRUCTIONS
[facility]  Same-Day Surgery   Adult Discharge Orders & Instructions     For 24 hours after surgery    1. Get plenty of rest.  A responsible adult must stay with you for at least 24 hours after you leave the hospital.   2. Do not drive or use heavy equipment.  If you have weakness or tingling, don't drive or use heavy equipment until this feeling goes away.  3. Do not drink alcohol.  4. Avoid strenuous or risky activities.  Ask for help when climbing stairs.   5. You may feel lightheaded.  IF so, sit for a few minutes before standing.  Have someone help you get up.   6. If you have nausea (feel sick to your stomach): Drink only clear liquids such as apple juice, ginger ale, broth or 7-Up.  Rest may also help.  Be sure to drink enough fluids.  Move to a regular diet as you feel able.  7. You may have a slight fever. Call the doctor if your fever is over 100 F (37.7 C) (taken under the tongue) or lasts longer than 24 hours.  8. You may have a dry mouth, a sore throat, muscle aches or trouble sleeping.  These should go away after 24 hours.  9. Do not make important or legal decisions.   Call your doctor for any of the followin.  Signs of infection (fever, growing tenderness at the surgery site, a large amount of drainage or bleeding, severe pain, foul-smelling drainage, redness, swelling).    2. It has been over 8 to 10 hours since surgery and you are still not able to urinate (pass water).    3.  Headache for over 24 hours.    To contact a doctor, call Dr. Hogue's office at [phone] or:        [phone] and ask for the resident on call for PULMONARY CARE (answered 24 hours a day)      Emergency Department:    [facility]: [phone]       (TTY for hearing impaired: [phone])

## 2017-01-18 NOTE — OR NURSING
Dr Carlos Celaya stated that  We did not need to call the Pacemaker nurse in for this procedure.  His pacemaker was interrogated

## 2017-01-18 NOTE — PROGRESS NOTES
"The following text page sent to Dr Hogue: \"PHASE II - Temo George - Patient and family would like to talk to MD prior to discharge? Please call Randall Hernandez at [phone] [phone]!\"  "

## 2017-01-19 LAB — COPATH REPORT: NORMAL

## 2017-01-20 LAB
ACID FAST STN SPEC QL: NORMAL
BACTERIA SPEC CULT: NO GROWTH
MICRO REPORT STATUS: NORMAL
MICRO REPORT STATUS: NORMAL
SPECIMEN SOURCE: NORMAL
SPECIMEN SOURCE: NORMAL

## 2017-01-21 LAB — COPATH REPORT: NORMAL

## 2017-01-22 ENCOUNTER — TELEPHONE (OUTPATIENT)
Dept: OTHER | Facility: CLINIC | Age: 32
End: 2017-01-22

## 2017-01-23 NOTE — TELEPHONE ENCOUNTER
Called patient to discuss the biopsy results.     The specimens that were obtained do not show sarcoidosis but that does not mean that he does not have it in the heart.     We are going to get the holter and PET scan results to decide on the next agent for immunosuppression.     Kaia Pino. MD

## 2017-01-24 NOTE — PROGRESS NOTES
CC: Dr. Siva Lam:   [facility]   [place]     Dear Siva,     I had the pleasure of seeing Temo George in  [facility] Cardiac Sarcoidosis clinic on January 17, 2017. As you know she is a pleasant 31 year old male who had an out of hospital cardiac arrest on 8/6/15 with a negative coronary CTA who then underwent secondary prevention SubQ ICD 8/20/2015. He has had a complicated course which I will summarize below but he is here to determine whether we can definitively assess for cardiac sarcoidosis and the develop a treatment plan going forward.     Shortly after his ICD placement he had recurrent VT causing LOC and therapy in October of 2016.  He was started on lidocaine gtt and Sotalol 120 mg PO BID. He was stabilized without recurrent VT and was discharged on Sotalol. He then came back for an outpatient EPS possible ablation on 9/4/15 which was non-inducible and no ablation was performed. He was then referred to [facility] for evaluation.     At that time he underwent a CMRI which showed evidence of subepicardial delayed enhancement along the inferior and inferolateral LV walls extending from base nearly to the apex. He subsequently had a PET in March 2016 which showed moderate perfusion abnormality involving inferior and inferolateral segments at the mid and basal level of LV with focal myocardial FDG uptake. Imaging was felt consistent with cardiac sarcoidosis.     At that time he was started on oral Prednisone, Bactrim, and Prilosec. He has gained about 20-30 lbs since being on steroids overall. A repeat PET in 9/2016 showed small mild inferior inferior and inferolateral perfusion abnormality and no FDG uptake. His prednisone has been tapered down at that time. He then had more shocks after this and his prednisone dose was increased empirically again, His sotolol is now at 160 mg BID. He has remained arrhyhtmia free since.     Of note, other than when he first presented with a high VT  burden, his EF has remains normal . He does not report any functional limitation however he is afraid to exert himself too much as this seems to correlate with times that that VT recurs.   He denies PND/orthopnea, chest pain, LE edema.     He is also seeing my EP colleague today Nav Pak who specializes in cardiac sarcoidosis.  He presents today for a second opinion on management of his VT and cardiac sarcoidosis.    He does not have any other sites of sarcoidosis that have yet been identified and denies skin, eye symptoms.     PAST MEDICAL HISTORY:  Past Medical History   Diagnosis Date     Cardiac arrest (H)      SVT (supraventricular tachycardia) (H)      Pulmonary embolism (H)      h/o that occurred in hospital     V-tach (H) 1/17/2017     H/O cardiac arrest 1/17/2017 8/2015     ICD (implantable cardioverter-defibrillator) in place 1/17/2017     Cardiac sarcoidosis (H) 1/17/2017   of note: SVT presented at 1 month old and then ultimately s/p ablation at 9 years       CURRENT MEDICATIONS:  Current Outpatient Prescriptions   Medication Sig Dispense Refill     omeprazole (PRILOSEC) 20 MG CR capsule        sulfamethoxazole-trimethoprim (BACTRIM DS) 800-160 MG per tablet        sotalol (BETAPACE) 160 MG tablet Take 160 mg by mouth 2 times daily       MAGNESIUM PO Magnesium Taurate 400 mg tab. 1 tablet by mouth two times a day.       PREDNISONE PO Take 20 mg by mouth daily         PAST SURGICAL HISTORY:  Past Surgical History   Procedure Laterality Date     Ep ablation child       S/p icd placement       [employer] defibrillator     Bronchoscopy rigid or flexible w/transendoscopic endobronchial ultrasound guided N/A 1/18/2017     Procedure: BRONCHOSCOPY RIGID OR FLEXIBLE W/TRANSENDOSCOPIC ENDOBRONCHIAL ULTRASOUND GUIDED;  Surgeon: Jose Antonio Hogue MD;  Location:  OR       ALLERGIES:     Allergies   Allergen Reactions     Penicillins        FAMILY HISTORY:  There is no family history of sudden  "cardiac death or premature heart failure. They are unaware of any other family members with autoimmune disease.       SOCIAL HISTORY:  Social History   Substance Use Topics     Smoking status: Never Smoker      Smokeless tobacco: Not on file     Alcohol Use: Yes      Comment: rarely       ROS:   A comprehensive 10 point review of systems negative other than as mentioned in HPI.    Exam:  /75 mmHg  Pulse 83  Ht 1.854 m (6' 1\")  Wt 112.946 kg (249 lb)  BMI 32.86 kg/m2  SpO2 95%  GENERAL APPEARANCE: healthy, alert and no distress, obese, pleasant   HEENT: no icterus, no xanthelasmas, normal pupil size and reaction, normal palate, mucosa moist, no central cyanosis  NECK: no adenopathy, no asymmetry, masses, or scars, thyroid normal to palpation and no bruits, JVP not elevated  RESPIRATORY: lungs clear to auscultation - no rales, rhonchi or wheezes, no use of accessory muscles, no retractions, respirations are unlabored, normal respiratory rate  CARDIOVASCULAR: PMI focal,  regular rhythm, normal S1 with physiologic split S2, no S3 or S4 and no murmur, click or rub   ABDOMEN: soft, non tender, without hepatosplenomegaly, no masses palpable, bowel sounds normal, aorta not enlarged by palpation, no abdominal bruits  EXTREMITIES: peripheral pulses normal, no edema, no bruits  NEURO: alert and oriented to person/place/time, normal speech, gait and affect  VASC: Radial, femoral, dorsalis pedis and posterior tibialis pulses are normal in volumes and symmetric bilaterally. No bruits are heard.  SKIN: no ecchymoses, no rashes    Labs:  Reviewed.     Testing/Procedures:  12/2015 ECHOCARDIOGRAM ([facility] REPORT):       2/2016 PET ([facility] REPORT):      3/2016 PET ([facility] REPORT):      ECG: sinus rhythm, isolated Q waves in III     Assessment and Plan:   In summary this is a very pleasant 31 year old male who has a past medical history significant an out of hospital SCA on 8/6/15 and a large burden of VT since that time who has a " presumptive diagnosis of cardiac sarcoidosis based on imaging and a clinical syndrome that was consistent with this diagnosis. He is presently on sotalol and a relatively high dose of prednisone and has had a period of relative stability.     The three questions that we need to address include 1) is cardiac sarcoid the diagnosis  2) is there active inflammation at present 3) and what will eventually be a maintenance immunosuppression that will be steroid sparing if this is sarcoidosis     With regard to the diagnosis, his imaging is highly consistent with cardiac sarcoidosis as is his clinical syndrome, He does have lymphadenopathy on CT imaging which appears amenable for a biopsy. The plan is for an EBUS tomorrow which he is already scheduled for. Given that we are exposing him to immune suppression at a young age it does feel worth it to our team to try for a definitive tissue diagnosis.     With regard to the question of disease activity - it may be that his recurrent VT is not indicative of ongoing inflammation  and this may be scar based. We are going to repeat his PET scan to ask the question of whether there is any disease activity at present and also obtain a holter to determine whether the PCVs are polymorphic, monomorphic, and are all of one morphology and cycle length,  or are there several. This will help monitor PVC burden and may help up with planning of any future ablations.      With regard to the choice of treatments, once the inflammation is under control by PET we often change to a steroid sparing agent:  I  think he would be an excellent candidate for infliximab or methotrexate and I think it is very important that we get him off of steroids soon. I also have a once monthly steroid protocol that we are using in many patients which has been very well tolerated and has been successful at controlling disease activity and this spares systemic side effects.. We are going to present him at our next  sarcoid multidisciplinary conference to have the weigh in from the group. If his PET is positive we may continue steroids for another month or two before switching over to a maintenance agent.      We are fortunate that despite the delayed enhancement present on MRI and inflammation present on PET that his ejection fraction remains normal and there is no clinical diagnosis of heart failure, yet. He does not require neurohormonal blockade at this point.     For his VT the higher dose of sotolol and perhaps the steroids appear to be effective at present. He has an ICD in place.     Overall plan:   We will look for the EBUS results and are ordering a holter and repeat cardiac PET to help guide our next decision.   He will be presented at our sarcoid conference      Please feel free to contact me with any further questions and thank you so much for involving us in the care of Temo.       Sincerely,     Kaia Pino  [facility]

## 2017-02-15 LAB
FUNGUS SPEC CULT: NORMAL
MICRO REPORT STATUS: NORMAL
SPECIMEN SOURCE: NORMAL

## 2017-03-15 LAB
MICRO REPORT STATUS: NORMAL
MYCOBACTERIUM SPEC CULT: NORMAL
SPECIMEN SOURCE: NORMAL

## 2017-03-21 ENCOUNTER — TELEPHONE (OUTPATIENT)
Dept: OTHER | Facility: CLINIC | Age: 32
End: 2017-03-21

## 2017-03-21 NOTE — TELEPHONE ENCOUNTER
Called patient today to discuss PET scan results.     While there is scar in the heart, there is no active inflammation and therefore we want him to taper off of his steroids.     He can start to reduce prednisone to 10 mg daily x 1 week and then 5 mg daily for 1 wee k then stop. When he stops prednisone he can stop PCP ppx (bactrim).     If he has recurrent VT we are going to consider ablation rather than more immunosuppression as based on his Holter results these appear to be coming from the scar in his heart and the morphology is monomorphic.     We will try him back to review the plan again later this week.     Kaia Pino

## 2017-03-22 ENCOUNTER — CARE COORDINATION (OUTPATIENT)
Dept: CARDIOLOGY | Facility: CLINIC | Age: 32
End: 2017-03-22

## 2017-03-22 NOTE — PROGRESS NOTES
"Patient calling into triage line asking to speak with Dr. Pino or her nurse regarding his post PET scan Prednisone taper.  He expresses concern over how fast the taper seems to be from past Prednisone tapers at [facility] when it was just \"1mg at a time\" and he just wants to be reassured that this faster taper is going to be ok.     Temo also would like to discuss with Dr. Benjamin his thoughts about possibly having an Ablation \"sooner than later\" rather than \"waiting around to see what happens\"     Will route this to Dr. Benjamin and Dr. Pino and their team to further advise and discuss with patient future therapeutic plan of care    Patient best reached at #[phone]  "

## 2017-03-23 ENCOUNTER — CARE COORDINATION (OUTPATIENT)
Dept: CARDIOLOGY | Facility: CLINIC | Age: 32
End: 2017-03-23

## 2017-03-23 DIAGNOSIS — D86.85 CARDIAC SARCOIDOSIS: Primary | ICD-10-CM

## 2017-03-23 DIAGNOSIS — R06.02 SOB (SHORTNESS OF BREATH): ICD-10-CM

## 2017-03-23 DIAGNOSIS — Z86.74 H/O CARDIAC ARREST: ICD-10-CM

## 2017-03-23 RX ORDER — PREDNISONE 5 MG/1
TABLET ORAL
Qty: 14 TABLET | Refills: 0 | Status: ON HOLD | OUTPATIENT
Start: 2017-03-23 | End: 2019-03-30

## 2017-03-23 NOTE — PROGRESS NOTES
"Received message below from triage nurse.    \"Patient calling into triage line asking to speak with Dr. Pino or her nurse regarding his post PET scan Prednisone taper. He expresses concern over how fast the taper seems to be from past Prednisone tapers at [facility] when it was just \"1mg at a time\" and he just wants to be reassured that this faster taper is going to be ok.     Temo also would like to discuss with Dr. Benjamin his thoughts about possibly having an Ablation \"sooner than later\" rather than \"waiting around to see what happens\"        I contacted patient this afternoon.  His questions were related to once he got down to prednisone 5 mg, should he decrease by 1 mg at a time as the [facility] had him do in the past?  We discussed that 5 mg is a low dose and he should have no problems coming off it by the time he gets to that dose.    In addition, he is wondering if the VT is coming from the scar in his heart, then it most likely will happen again as he comes off the steroids.  He is a bit nervous about it, since he has been shocked by his ICD 9 times, and would prefer just moving ahead with the ablation rather waiting to see if it will happen again.  I will review his questions regarding the ablation with Dr. Pino.  He will otherwise return to see Dr. Pino in 3 months with an echo, labs same day.  "

## 2017-03-24 ENCOUNTER — CARE COORDINATION (OUTPATIENT)
Dept: CARDIOLOGY | Facility: CLINIC | Age: 32
End: 2017-03-24

## 2017-03-24 NOTE — PROGRESS NOTES
"Per Dr. Benjamin: no clear indication for VT ablation right now. The side effects of his long term steroids are more of a concern than the possibility of VT recurrence - tapering down is the priority. His sotalol should keep the VT under control.    Called the patient and relayed the above.     He is anxious about the thought of tapering his steroid since the last time he tapered, he experienced multiple ICD shocks. He reports he was started on steroids in March 2016 and was down to a \"low single digit dose\" in October 2016 when he was shocked three times. He then restarted on prednisone and has continued on it without issue. Therefore, he expresses hesitation at tapering completely off the drug and at a faster rate than historically. He has not begun his taper yet. He thinks he will get shocked if the inflammation returns and wonders how his inflammation will be monitored. Reviewed Dr. Benjamin's note saying his VT is scar-mediated, and likely not due to the inflammation - of which the patient is aware.     Dr. Benjamin will call patient upon return from vacation in 1-2 weeks to discuss further and address patient's concerns. Patient verbalized understanding and is in agreement to the plan.    Note routed to Dr. Benjamin and Linda Mathews NP and Dr. Pino's team as an FYI.  "

## 2017-04-06 ENCOUNTER — TRANSFERRED RECORDS (OUTPATIENT)
Dept: HEALTH INFORMATION MANAGEMENT | Facility: CLINIC | Age: 32
End: 2017-04-06

## 2017-04-13 ENCOUNTER — TELEPHONE (OUTPATIENT)
Dept: CARDIOLOGY | Facility: CLINIC | Age: 32
End: 2017-04-13

## 2017-04-13 NOTE — TELEPHONE ENCOUNTER
I called Mr Galvez today and explained to him what Dr Rueda already touched base with him about. 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 LDAC would have implications on genetic testing for him and his family. I explained to him why we are weaning him off the steroids slowly. We will not consider ablation now because he has not had any events since Oct 2016, on sotalol. If the burden is low, it will decrease our chances of induction and effective ablation, especially that we would need probably epicardial ablation and that limits how many times we can take ablate and access the pericardium. If he has recurrence while on the current dose of sotalol, , we will probably pursue ablation. HE agreed with all the above and we will contact him to coordinate a follow-up visit with me and Dr Rueda at the same day.

## 2017-10-18 ASSESSMENT — ENCOUNTER SYMPTOMS
SMELL DISTURBANCE: 0
SORE THROAT: 1
NECK MASS: 0
TROUBLE SWALLOWING: 0
TASTE DISTURBANCE: 0
HOARSE VOICE: 0
SINUS CONGESTION: 1
SINUS PAIN: 0

## 2017-10-30 ENCOUNTER — PRE VISIT (OUTPATIENT)
Dept: CARDIOLOGY | Facility: CLINIC | Age: 32
End: 2017-10-30

## 2017-10-30 DIAGNOSIS — I47.20 V-TACH (H): Primary | ICD-10-CM

## 2017-10-30 DIAGNOSIS — Z51.81 ENCOUNTER FOR MONITORING SOTALOL THERAPY: ICD-10-CM

## 2017-10-30 DIAGNOSIS — Z79.899 ENCOUNTER FOR MONITORING SOTALOL THERAPY: ICD-10-CM

## 2017-10-30 ASSESSMENT — ENCOUNTER SYMPTOMS
TROUBLE SWALLOWING: 0
HOARSE VOICE: 0
SMELL DISTURBANCE: 0
TASTE DISTURBANCE: 0
SORE THROAT: 1
SINUS PAIN: 0
SINUS CONGESTION: 1
NECK MASS: 0

## 2017-11-01 ENCOUNTER — ALLIED HEALTH/NURSE VISIT (OUTPATIENT)
Dept: CARDIOLOGY | Facility: CLINIC | Age: 32
End: 2017-11-01
Attending: INTERNAL MEDICINE
Payer: COMMERCIAL

## 2017-11-01 ENCOUNTER — RADIANT APPOINTMENT (OUTPATIENT)
Dept: CARDIOLOGY | Facility: CLINIC | Age: 32
End: 2017-11-01

## 2017-11-01 VITALS
HEIGHT: 73 IN | OXYGEN SATURATION: 94 % | SYSTOLIC BLOOD PRESSURE: 108 MMHG | HEART RATE: 70 BPM | DIASTOLIC BLOOD PRESSURE: 70 MMHG | BODY MASS INDEX: 31.7 KG/M2 | WEIGHT: 239.2 LBS

## 2017-11-01 DIAGNOSIS — R06.02 SOB (SHORTNESS OF BREATH): ICD-10-CM

## 2017-11-01 DIAGNOSIS — I47.20 V-TACH (H): ICD-10-CM

## 2017-11-01 DIAGNOSIS — Z51.81 ENCOUNTER FOR MONITORING SOTALOL THERAPY: ICD-10-CM

## 2017-11-01 DIAGNOSIS — Z79.899 ENCOUNTER FOR MONITORING SOTALOL THERAPY: ICD-10-CM

## 2017-11-01 DIAGNOSIS — D86.85 CARDIAC SARCOIDOSIS: Primary | ICD-10-CM

## 2017-11-01 DIAGNOSIS — D86.85 CARDIAC SARCOIDOSIS: ICD-10-CM

## 2017-11-01 DIAGNOSIS — Z86.74 H/O CARDIAC ARREST: ICD-10-CM

## 2017-11-01 LAB
ANION GAP SERPL CALCULATED.3IONS-SCNC: 6 MMOL/L (ref 3–14)
BUN SERPL-MCNC: 18 MG/DL (ref 7–30)
CALCIUM SERPL-MCNC: 8.6 MG/DL (ref 8.5–10.1)
CHLORIDE SERPL-SCNC: 105 MMOL/L (ref 94–109)
CO2 SERPL-SCNC: 27 MMOL/L (ref 20–32)
CREAT SERPL-MCNC: 0.81 MG/DL (ref 0.66–1.25)
CRP SERPL-MCNC: <2.9 MG/L (ref 0–8)
GFR SERPL CREATININE-BSD FRML MDRD: >90 ML/MIN/1.7M2
GLUCOSE SERPL-MCNC: 102 MG/DL (ref 70–99)
NT-PROBNP SERPL-MCNC: 51 PG/ML (ref 0–125)
POTASSIUM SERPL-SCNC: 4 MMOL/L (ref 3.4–5.3)
SODIUM SERPL-SCNC: 138 MMOL/L (ref 133–144)
TROPONIN I SERPL-MCNC: <0.015 UG/L (ref 0–0.04)

## 2017-11-01 PROCEDURE — 99213 OFFICE O/P EST LOW 20 MIN: CPT

## 2017-11-01 PROCEDURE — 99213 OFFICE O/P EST LOW 20 MIN: CPT | Mod: 25,ZF

## 2017-11-01 PROCEDURE — 99214 OFFICE O/P EST MOD 30 MIN: CPT | Mod: 25 | Performed by: INTERNAL MEDICINE

## 2017-11-01 PROCEDURE — 93005 ELECTROCARDIOGRAM TRACING: CPT | Mod: ZF

## 2017-11-01 PROCEDURE — 84484 ASSAY OF TROPONIN QUANT: CPT | Performed by: INTERNAL MEDICINE

## 2017-11-01 PROCEDURE — 83880 ASSAY OF NATRIURETIC PEPTIDE: CPT | Performed by: INTERNAL MEDICINE

## 2017-11-01 PROCEDURE — 36415 COLL VENOUS BLD VENIPUNCTURE: CPT | Performed by: INTERNAL MEDICINE

## 2017-11-01 PROCEDURE — 80048 BASIC METABOLIC PNL TOTAL CA: CPT | Performed by: INTERNAL MEDICINE

## 2017-11-01 PROCEDURE — 93260 PRGRMG DEV EVAL IMPLTBL SYS: CPT | Mod: ZF

## 2017-11-01 PROCEDURE — 86140 C-REACTIVE PROTEIN: CPT | Performed by: INTERNAL MEDICINE

## 2017-11-01 PROCEDURE — 93261 INTERROGATE SUBQ DEFIB: CPT | Mod: 26 | Performed by: INTERNAL MEDICINE

## 2017-11-01 PROCEDURE — 93010 ELECTROCARDIOGRAM REPORT: CPT | Mod: ZP | Performed by: INTERNAL MEDICINE

## 2017-11-01 ASSESSMENT — PAIN SCALES - GENERAL: PAINLEVEL: NO PAIN (0)

## 2017-11-01 NOTE — MR AVS SNAPSHOT
After Visit Summary   11/1/2017    Temo George    MRN: [id number]           Patient Information     Date Of Birth          1985        Visit Information        Provider Department      11/1/2017 3:00 PM Nav Benjamin MD [facility]        Today's Diagnoses     V-tach (H)        Encounter for monitoring sotalol therapy          Care Instructions      Follow up 1 year    Cardiovascular Clinic:   [address] [place] [address]. [place] [address]  Your Care Team:  EP Cardiology   Telephone Number     Linda Mathews NP  Dr. Nav Benjamin [phone]   Julienne Bynum RN   [phone]     For scheduling appts or procedures:    Bev Pacheco   [phone]   For the Device Clinic (Pacemakers and ICD's)   RN's :   Maryana Santana  During business hours: [phone]     After business hours:   [phone]- select option 4 and ask for job code [id number].       As always, Thank you for trusting us with your health care needs!            Follow-ups after your visit        Follow-up notes from your care team     Return in about 1 year (around 11/1/2018) for Dr. Benjamin.      Your next 10 appointments already scheduled     Nov 02, 2017  9:00 AM CDT   (Arrive by 8:45 AM)   RETURN HEART FAILURE with Kaia Pino MD   [facility] ([facility])    [address] [place] [address] [phone]              Who to contact     If you have questions or need follow up information about today's clinic visit or your schedule please contact [facility] directly at [phone].  Normal or non-critical lab and imaging results will be communicated to you by MyChart, letter or phone within 4 business days after the clinic has received the results. If you do not hear from us within 7 days, please contact the clinic through MyChart or phone. If you have a critical or abnormal lab result, we will notify you by phone as soon as  "possible.  Submit refill requests through [employer] or call your pharmacy and they will forward the refill request to us. Please allow 3 business days for your refill to be completed.          Additional Information About Your Visit        Ofidiumhart Information     [employer] gives you secure access to your electronic health record. If you see a primary care provider, you can also send messages to your care team and make appointments. If you have questions, please call your primary care clinic.  If you do not have a primary care provider, please call [phone] and they will assist you.        Care EveryWhere ID     This is your Care EveryWhere ID. This could be used by other organizations to access your [facility] medical records  [id number]        Your Vitals Were     Pulse Height Pulse Oximetry BMI (Body Mass Index)          70 1.854 m (6' 1\") 94% 31.56 kg/m2         Blood Pressure from Last 3 Encounters:   11/01/17 108/70   01/18/17 107/81   01/17/17 123/75    Weight from Last 3 Encounters:   11/01/17 108.5 kg (239 lb 3.2 oz)   01/18/17 113.9 kg (251 lb 1.7 oz)   01/17/17 112.9 kg (249 lb)              We Performed the Following     EKG 12-lead, tracing only (Future)        Primary Care Provider Office Phone # Fax #    Jovanni Lam [phone]       [facility] HEART [facility]ST [address]  [place] [address]        Equal Access to Services     [facility]: Hadii aad ku hadasho Soomaali, waaxda luqadaha, qaybta kaalmada adeegyada, channing caba . So [facility] [phone].    ATENCIÓN: Si habla español, tiene a dial disposición servicios gratuitos de asistencia lingüística. Llame al [phone].    We comply with applicable federal civil rights laws and [place] laws. We do not discriminate on the basis of race, color, national origin, age, disability, sex, sexual orientation, or gender identity.            Thank you!     Thank you for choosing [facility]  for " your care. Our goal is always to provide you with excellent care. Hearing back from our patients is one way we can continue to improve our services. Please take a few minutes to complete the written survey that you may receive in the mail after your visit with us. Thank you!             Your Updated Medication List - Protect others around you: Learn how to safely use, store and throw away your medicines at www.disposemymeds.org.          This list is accurate as of: 11/1/17  3:39 PM.  Always use your most recent med list.                   Brand Name Dispense Instructions for use Diagnosis    BACTRIM -160 MG per tablet   Generic drug:  sulfamethoxazole-trimethoprim           MAGNESIUM PO      Magnesium Taurate 400 mg tab. 1 tablet by mouth two times a day.        omeprazole 20 MG CR capsule    priLOSEC     20 mg daily        * PREDNISONE PO      Take 20 mg by mouth daily        * predniSONE 5 MG tablet    DELTASONE    14 tablet    Take as directed one tab daily    Cardiac sarcoidosis       sotalol 160 MG tablet    BETAPACE     Take 80 mg by mouth 2 times daily Take 2 tabs (80mg) twice daily        * Notice:  This list has 2 medication(s) that are the same as other medications prescribed for you. Read the directions carefully, and ask your doctor or other care provider to review them with you.

## 2017-11-01 NOTE — PATIENT INSTRUCTIONS
Follow up 1 year    Cardiovascular Clinic:   [address] [place] [address]. [place] [address]  Your Care Team:  EP Cardiology   Telephone Number     Linda Mathews NP  Dr. Nav Benjamin [phone]   Julienne Bynum RN   [phone]     For scheduling appts or procedures:    Bev Pacheco   [phone]   For the Device Clinic (Pacemakers and ICD's)   RN's :   Maryana Santana  During business hours: [phone]     After business hours:   [phone]- select option 4 and ask for job code [id number].       As always, Thank you for trusting us with your health care needs!

## 2017-11-01 NOTE — MR AVS SNAPSHOT
After Visit Summary   11/1/2017    Temo George    MRN: [id number]           Patient Information     Date Of Birth          1985        Visit Information        Provider Department      11/1/2017 2:30 PM 1, [facility] Cv Device [facility]        Today's Diagnoses     Cardiac sarcoidosis    -  1       Follow-ups after your visit        Your next 10 appointments already scheduled     Nov 01, 2017  3:00 PM CDT   (Arrive by 2:45 PM)   RETURN ARRHYTHMIA with Nav Benjamin MD   [facility])    [address] [place] [address] [phone]            Nov 02, 2017  9:00 AM CDT   (Arrive by 8:45 AM)   RETURN HEART FAILURE with Kaia Pino MD   [facility])    [address] [place] [address] [phone]              Who to contact     If you have questions or need follow up information about today's clinic visit or your schedule please contact [facility] directly at [phone].  Normal or non-critical lab and imaging results will be communicated to you by [employer], letter or phone within 4 business days after the clinic has received the results. If you do not hear from us within 7 days, please contact the clinic through [employer] or phone. If you have a critical or abnormal lab result, we will notify you by phone as soon as possible.  Submit refill requests through [employer] or call your pharmacy and they will forward the refill request to us. Please allow 3 business days for your refill to be completed.          Additional Information About Your Visit        MyChart Information     [employer] gives you secure access to your electronic health record. If you see a primary care provider, you can also send messages to your care team and make appointments. If you have questions, please call your primary care clinic.  If you do not have a primary  care provider, please call [phone] and they will assist you.        Care EveryWhere ID     This is your Care EveryWhere ID. This could be used by other organizations to access your [facility] medical records  [id number]         Blood Pressure from Last 3 Encounters:   01/18/17 107/81   01/17/17 123/75   01/17/17 123/75    Weight from Last 3 Encounters:   01/18/17 113.9 kg (251 lb 1.7 oz)   01/17/17 112.9 kg (249 lb)   01/17/17 113.1 kg (249 lb 6.4 oz)              We Performed the Following     HC PRGRMG DEV EVAL IMPLANTABLE SUBQ LEAD DFB SYSTEM        Primary Care Provider Office Phone # Fax #    Jovanni Lam [phone]       [facility] HEART [facility] [address] [facility] [place] [address]        Equal Access to Services     [facility]: Hadii aad ku hadasho Soomaali, waaxda luqadaha, qaybta kaalmada adeegyada, waxay ewain hayaan adejordon caba . So [facility] [phone].    ATENCIÓN: Si habla español, tiene a dial disposición servicios gratuitos de asistencia lingüística. Llame al [phone].    We comply with applicable federal civil rights laws and [place] laws. We do not discriminate on the basis of race, color, national origin, age, disability, sex, sexual orientation, or gender identity.            Thank you!     Thank you for choosing [facility]  for your care. Our goal is always to provide you with excellent care. Hearing back from our patients is one way we can continue to improve our services. Please take a few minutes to complete the written survey that you may receive in the mail after your visit with us. Thank you!             Your Updated Medication List - Protect others around you: Learn how to safely use, store and throw away your medicines at www.disposemymeds.org.          This list is accurate as of: 11/1/17  2:48 PM.  Always use your most recent med list.                   Brand Name Dispense Instructions for use Diagnosis    BACTRIM -160 MG per  tablet   Generic drug:  sulfamethoxazole-trimethoprim           MAGNESIUM PO      Magnesium Taurate 400 mg tab. 1 tablet by mouth two times a day.        omeprazole 20 MG CR capsule    priLOSEC     20 mg daily        * PREDNISONE PO      Take 20 mg by mouth daily        * predniSONE 5 MG tablet    DELTASONE    14 tablet    Take as directed one tab daily    Cardiac sarcoidosis       sotalol 160 MG tablet    BETAPACE     Take 80 mg by mouth 2 times daily Take 2 tabs (80mg) twice daily        * Notice:  This list has 2 medication(s) that are the same as other medications prescribed for you. Read the directions carefully, and ask your doctor or other care provider to review them with you.

## 2017-11-01 NOTE — PROGRESS NOTES
Pt seen in the [facility] for evaluation and iterative programming of a [employer], Subcutaneous, single lead ICD, per MD orders. He also has appointments with Dr. Benjamin and Dr. Pino. Today his intrinsic rhythm is a regular ventricular rhythm @ 70 bpm. Normal ICD function. No arrhythmias recorded. Battery estimates 67% remaining until LANNY. Plan for pt to continue care with his primary device clinic in [place], [place].  Subcutaneous lead ICD

## 2017-11-01 NOTE — NURSING NOTE
.  Chief Complaint   Patient presents with     Follow Up For     VT mgmt. Labs, echo, EKG, ICD.     Vitals were taken and medications were reconciled.  ERIC Mleissa  2:43 PM

## 2017-11-01 NOTE — PROGRESS NOTES
Electrophysiology Clinic Note  HPI:   Mr. George is a 32 year old male who has a past medical history significant for SVT started at 1 mo age and then ultimately s/p ablation at 9 years old by Dr. 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     He initially he presented to an OSH on 8/6/15 after suffering an out of hospital arrest. He underwent a coronary CT which was unremarkable, had normal baseline ECG, and initial Echo showed LVEF 40-45% and at time of discharge was 55-60%. He underwent a SubQ ICD implant on 8/20/15. Nine days later, on 8/29/15, he had sudden onset of lightheadedness, palpitations followed in quick succession by ICD shocks. VT CL was 200-240 ms. He converted with each shock but recurred. He was started on lidocaine gtt and Sotalol 120 mg PO BID. He was stabilized without recurrent VT and was discharged on Sotalol. He then came back for an outpatient EPS possible ablation on 9/4/15 which was non-inducible and no ablation was performed. He was then referred to [facility] for evaluation. He underwent an CMRI showed evidence of subepicardial DE along the inferior and inferolateral LV walls extending from base nearly to the apex. He subsequently had a PET in March 2016 which showed moderate perfusion abnormality involving inferior and inferolateral segments at the mid and basal level of LV with focal myocardial FDG uptake. Imaging was felt consistent with cardiac sarcoidosis. He was started on oral Prednisone, Bactrim, and Prilosec. A repeat PET in 9/2016 showed small mild inferior inferior and inferolateral perfusion abnormality and no FDG uptake. Subsequent PET scans have demonstrated progression of the inferolateral, transmural scar.  He also underwent a EBUS  which was negative for CS. 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 He had 3 ICD shocks in 10/2016 for which his Sotalol was increased to 160 mg BID. He has remained arrhyhtmia free since. His prednisone has been tapered down and now off. He has no family history of early CAD, SCD, or congenital anomalies.       He presents today for follow up. He underwent a 12 lead Holter in 1/2017 which showed sinus throughout with occasional PVCs. He reports feeling well. His device interrogation today shows normal device function. He denies any chest pain, dizziness, lightheadedness, shortness of breath, palpitations, or syncopal symptoms. Echo today shows normal structure and function. Presenting 12 lead ECG shows NSR Vent Rate 68 bpm,  ms, QRS 90 ms, QTc 429 ms. Current cardiac medications include: Sotalol     PAST MEDICAL HISTORY:  Past Medical History:   Diagnosis Date     Cardiac arrest (H)      Cardiac sarcoidosis 1/17/2017     H/O cardiac arrest 1/17/2017 8/2015     ICD (implantable cardioverter-defibrillator) in place 1/17/2017     Pulmonary embolism (H)     h/o that occurred in hospital     SVT (supraventricular tachycardia) (H)      V-tach (H) 1/17/2017       CURRENT MEDICATIONS:  Current Outpatient Prescriptions   Medication Sig Dispense Refill     sotalol (BETAPACE) 160 MG tablet Take 80 mg by mouth 2 times daily Take 2 tabs (80mg) twice daily       MAGNESIUM PO Magnesium Taurate 400 mg tab. 1 tablet by mouth two times a day.       predniSONE (DELTASONE) 5 MG tablet Take as directed one tab daily (Patient not taking: Reported on 11/1/2017) 14 tablet 0     omeprazole (PRILOSEC) 20 MG CR capsule 20 mg daily        sulfamethoxazole-trimethoprim (BACTRIM DS) 800-160 MG per tablet        PREDNISONE PO Take 20 mg by mouth daily         PAST SURGICAL HISTORY:  Past Surgical History:   Procedure Laterality Date     BRONCHOSCOPY RIGID OR FLEXIBLE W/TRANSENDOSCOPIC ENDOBRONCHIAL ULTRASOUND  "GUIDED N/A 1/18/2017    Procedure: BRONCHOSCOPY RIGID OR FLEXIBLE W/TRANSENDOSCOPIC ENDOBRONCHIAL ULTRASOUND GUIDED;  Surgeon: Jose Antonio Hogue MD;  Location: [facility] OR     EP ABLATION CHILD       s/p ICD placement      [employer] defibrillator       ALLERGIES:     Allergies   Allergen Reactions     Penicillins        FAMILY HISTORY:  No family history on file.    SOCIAL HISTORY:  Social History   Substance Use Topics     Smoking status: Never Smoker     Smokeless tobacco: Not on file     Alcohol use Yes      Comment: rarely       ROS:   A comprehensive 10 point review of systems negative other than as mentioned in HPI.  Exam:  /70 (BP Location: Left arm, Patient Position: Chair, Cuff Size: Adult Large)  Pulse 70  Ht 1.854 m (6' 1\")  Wt 108.5 kg (239 lb 3.2 oz)  SpO2 94%  BMI 31.56 kg/m2  GENERAL APPEARANCE: healthy, alert and no distress  HEENT: no icterus, no xanthelasmas, normal pupil size and reaction, normal palate, mucosa moist, no central cyanosis  NECK: no adenopathy, no asymmetry, masses, or scars, thyroid normal to palpation and no bruits, JVP not elevated  RESPIRATORY: lungs clear to auscultation - no rales, rhonchi or wheezes, no use of accessory muscles, no retractions, respirations are unlabored, normal respiratory rate  CARDIOVASCULAR: regular rhythm, normal S1 with physiologic split S2, no S3 or S4 and no murmur, click or rub, precordium quiet with normal PMI.  ABDOMEN: soft, non tender, without hepatosplenomegaly, no masses palpable, bowel sounds normal, aorta not enlarged by palpation, no abdominal bruits  EXTREMITIES: peripheral pulses normal, no edema, no bruits  NEURO: alert and oriented to person/place/time, normal speech, gait and affect  VASC: Radial, femoral, dorsalis pedis and posterior tibialis pulses are normal in volumes and symmetric bilaterally. No bruits are heard.  SKIN: no ecchymoses, no rashes    Labs:  Reviewed.     Testing/Procedures:  12/2015 ECHOCARDIOGRAM " (OSH REPORT):       2/2016 PET (OSH REPORT):      3/2016 PET (OSH REPORT):      3/13/2017 PET:  Impression:  1. The perfusion study demonstrate interval worsening of the perfusion  abnormality involving the inferolateral wall of left ventricle which  now appears to be transmural. There is also increased reduction of  perfusion of the mid septum to almost transmural. The perfusion score  is 18, previous study did not provide a perfusion score.  2. No FDG localization within the heart to indicate active myocardial  sarcoid.  3. LV ejection fraction borderline low at 49%, previous examination  did not provide a LV ejection fraction.  4. Perfusion changes of the heart did not follow normal vascular  anatomy and thus is believed to be small vessel myocardial scarring.    11/1/17 ECHOCARDIOGRAM:  Interpretation Summary  Global and regional left ventricular function is normal with an EF of 55-60%.  Left ventricular wall thickness is normal.  Left ventricular size is normal.  Right ventricular function, chamber size, wall motion, and thickness are  normal.  The inferior vena cava is normal.  No pericardial effusion is present.  Previous study not available for comparison.    Assessment and Plan:   Mr. George is a 32 year old male who has a past medical history significant for SVT started at 1 mo age and then ultimately s/p ablation at 9 years old by Dr. 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     He initially presented with a VT storm and SCA in August 2015 and had relatively normal work up and underwent a secondary prevention SubQ ICD. He had early recurrence of VT and was placed on Sotalol 120 mg BID and was stable until October 2016 when he had 3 shocks from his ICD (2 in one  event and a week later a single shock). His Sotalol dose was increased to 160 mg BID which he continues on today. He has been arrhymia free for over a year now. QTc and renal function stable. His imaging initally showed concerns for CS for which he was treated with steroids.  Repeat PET in 9/2016 showed small mild inferior inferior and inferolateral perfusion abnormality and no FDG uptake. Subsequent PET scans have demonstrated progression of the inferolateral, transmural scar.  He also underwent a EBUS which was negative for CS. 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 He has been tapered off steroids. Genetic testing has been discussed with him and his family. Echo today shows normal structure and function. He is doing well from an EP standpoint and we will not make any changes. We would consider ablation if recurrent VT. He will continue to follow with device clinic per routine and Dr. Sosa. He can follow up with EP in 1 year.      The patient states understanding and is agreeable with plan.   This patient was seen and evaluated with Dr. Benjamin. The above note reflects our joint assessment and plan.   LIANA Reyes CNP  Pager: [phone]    EP STAFF NOTE  I have seen and examined the patient as part of a shared visit with DAVID Reyes NP.  I agree with the note above. I reviewed today's vital signs and medications. 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  My key history/exam findings are: RRR.   The key management decisions made by me: f/u in 1 year.    Nav Benjamin MD [facility]  Cardiology - Electrophysiology    CC  ISAI SOSA

## 2017-11-01 NOTE — LETTER
11/1/2017      RE: Temo George  [address] [facility]  [place] [facility] [address]       Dear Colleague,    Thank you for the opportunity to participate in the care of your patient, Temo George, at the [facility] HEART [facility] at [facility]. Please see a copy of my visit note below.    Electrophysiology Clinic Note  HPI:   Mr. George is a 32 year old male who has a past medical history significant for SVT started at 1 mo age and then ultimately s/p ablation at 9 years old by Dr. 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     He initially he presented to an OSH on 8/6/15 after suffering an out of hospital arrest. He underwent a coronary CT which was unremarkable, had normal baseline ECG, and initial Echo showed LVEF 40-45% and at time of discharge was 55-60%. He underwent a SubQ ICD implant on 8/20/15. Nine days later, on 8/29/15, he had sudden onset of lightheadedness, palpitations followed in quick succession by ICD shocks. VT CL was 200-240 ms. He converted with each shock but recurred. He was started on lidocaine gtt and Sotalol 120 mg PO BID. He was stabilized without recurrent VT and was discharged on Sotalol. He then came back for an outpatient EPS possible ablation on 9/4/15 which was non-inducible and no ablation was performed. He was then referred to [facility] for evaluation. He underwent an CMRI showed evidence of subepicardial DE along the inferior and inferolateral LV walls extending from base nearly to the apex. He subsequently had a PET in March 2016 which showed moderate perfusion abnormality involving inferior and inferolateral segments at the mid and basal level of LV with focal myocardial FDG uptake. Imaging was felt  consistent with cardiac sarcoidosis. He was started on oral Prednisone, Bactrim, and Prilosec. A repeat PET in 9/2016 showed small mild inferior inferior and inferolateral perfusion abnormality and no FDG uptake. Subsequent PET scans have demonstrated progression of the inferolateral, transmural scar.   He also underwent a EBUS which was negative for CS. 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 He had 3 ICD shocks in 10/2016 for which his Sotalol was increased to 160 mg BID. He has remained arrhyhtmia free since. His prednisone has been tapered down and now off. He has no family history of early CAD, SCD, or congenital anomalies.       He presents today for follow up. He underwent a 12 lead Holter in 1/2017 which showed sinus throughout with occasional PVCs. He reports feeling well. His device interrogation today shows normal device function. He denies any chest pain, dizziness, lightheadedness, shortness of breath, palpitations, or syncopal symptoms. Echo today shows normal structure and function. Presenting 12 lead ECG shows NSR Vent Rate 68 bpm,  ms, QRS 90 ms, QTc 429 ms. Current cardiac medications include: Sotalol     PAST MEDICAL HISTORY:  Past Medical History:   Diagnosis Date     Cardiac arrest (H)      Cardiac sarcoidosis 1/17/2017     H/O cardiac arrest 1/17/2017 8/2015     ICD (implantable cardioverter-defibrillator) in place 1/17/2017     Pulmonary embolism (H)     h/o that occurred in hospital     SVT (supraventricular tachycardia) (H)      V-tach (H) 1/17/2017       CURRENT MEDICATIONS:  Current Outpatient Prescriptions   Medication Sig Dispense Refill     sotalol (BETAPACE) 160 MG tablet Take 80 mg by mouth 2 times daily Take 2 tabs (80mg) twice daily       MAGNESIUM PO Magnesium Taurate 400 mg tab. 1 tablet by mouth two times a day.       predniSONE (DELTASONE) 5 MG tablet Take as directed one tab daily (Patient not taking: Reported on 11/1/2017) 14 tablet 0      "omeprazole (PRILOSEC) 20 MG CR capsule 20 mg daily        sulfamethoxazole-trimethoprim (BACTRIM DS) 800-160 MG per tablet        PREDNISONE PO Take 20 mg by mouth daily         PAST SURGICAL HISTORY:  Past Surgical History:   Procedure Laterality Date     BRONCHOSCOPY RIGID OR FLEXIBLE W/TRANSENDOSCOPIC ENDOBRONCHIAL ULTRASOUND GUIDED N/A 1/18/2017    Procedure: BRONCHOSCOPY RIGID OR FLEXIBLE W/TRANSENDOSCOPIC ENDOBRONCHIAL ULTRASOUND GUIDED;  Surgeon: Jose Antonio Hogue MD;  Location: [facility] OR     EP ABLATION CHILD       s/p ICD placement      [employer] defibrillator       ALLERGIES:     Allergies   Allergen Reactions     Penicillins        FAMILY HISTORY:  No family history on file.    SOCIAL HISTORY:  Social History   Substance Use Topics     Smoking status: Never Smoker     Smokeless tobacco: Not on file     Alcohol use Yes      Comment: rarely       ROS:   A comprehensive 10 point review of systems negative other than as mentioned in HPI.  Exam:  /70 (BP Location: Left arm, Patient Position: Chair, Cuff Size: Adult Large)  Pulse 70  Ht 1.854 m (6' 1\")  Wt 108.5 kg (239 lb 3.2 oz)  SpO2 94%  BMI 31.56 kg/m2  GENERAL APPEARANCE: healthy, alert and no distress  HEENT: no icterus, no xanthelasmas, normal pupil size and reaction, normal palate, mucosa moist, no central cyanosis  NECK: no adenopathy, no asymmetry, masses, or scars, thyroid normal to palpation and no bruits, JVP not elevated  RESPIRATORY: lungs clear to auscultation - no rales, rhonchi or wheezes, no use of accessory muscles, no retractions, respirations are unlabored, normal respiratory rate  CARDIOVASCULAR: regular rhythm, normal S1 with physiologic split S2, no S3 or S4 and no murmur, click or rub, precordium quiet with normal PMI.  ABDOMEN: soft, non tender, without hepatosplenomegaly, no masses palpable, bowel sounds normal, aorta not enlarged by palpation, no abdominal bruits  EXTREMITIES: peripheral pulses normal, no edema, " no bruits  NEURO: alert and oriented to person/place/time, normal speech, gait and affect  VASC: Radial, femoral, dorsalis pedis and posterior tibialis pulses are normal in volumes and symmetric bilaterally. No bruits are heard.  SKIN: no ecchymoses, no rashes    Labs:  Reviewed.     Testing/Procedures:  12/2015 ECHOCARDIOGRAM (OSH REPORT):       2/2016 PET (OSH REPORT):      3/2016 PET (OSH REPORT):      3/13/2017 PET:  Impression:  1. The perfusion study demonstrate interval worsening of the perfusion  abnormality involving the inferolateral wall of left ventricle which  now appears to be transmural. There is also increased reduction of  perfusion of the mid septum to almost transmural. The perfusion score  is 18, previous study did not provide a perfusion score.  2. No FDG localization within the heart to indicate active myocardial  sarcoid.  3. LV ejection fraction borderline low at 49%, previous examination  did not provide a LV ejection fraction.  4. Perfusion changes of the heart did not follow normal vascular  anatomy and thus is believed to be small vessel myocardial scarring.    11/1/17 ECHOCARDIOGRAM:  Interpretation Summary  Global and regional left ventricular function is normal with an EF of 55-60%.  Left ventricular wall thickness is normal.  Left ventricular size is normal.  Right ventricular function, chamber size, wall motion, and thickness are  normal.  The inferior vena cava is normal.  No pericardial effusion is present.  Previous study not available for comparison.    Assessment and Plan:   Mr. George is a 32 year old male who has a past medical history significant for SVT started at 1 mo age and then ultimately s/p ablation at 9 years old by Dr. Lynn, out of hospital SCA on 8/6/15 from VT Storm work up negative s/p secondary prevention SubQ ICD 8/20/2015 with recurrent ICD shocks on 8/29/16, EPS at [place] 9/2015 non-inducible no ablation performed,  ICD shocks X3 October 2016, and PE when  hospitalized thought to have ALVC.     He initially presented with a VT storm and SCA in August 2015 and had relatively normal work up and underwent a secondary prevention SubQ ICD. He had early recurrence of VT and was placed on Sotalol 120 mg BID and was stable until October 2016 when he had 3 shocks from his ICD (2 in one event and a week later a single shock). His Sotalol dose was increased to 160 mg BID which he continues on today. He has been arrhymia free for over a year now. QTc and renal function stable. His imaging initally showed concerns for CS for which he was treated with steroids.  Repeat PET in 9/2016 showed small mild inferior inferior and inferolateral perfusion abnormality and no FDG uptake. Subsequent PET scans have demonstrated progression of the inferolateral, transmural scar.   He also underwent a EBUS which was negative for CS. 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 He has been tapered off steroids. Genetic testing has been discussed with him and his family. Echo today shows normal structure and function. He is doing well from an EP standpoint and we will not make any changes. We would consider ablation if recurrent VT. He will continue to follow with device clinic per routine and Dr. Pino. He can follow up with EP in 1 year.      The patient states understanding and is agreeable with plan.   This patient was seen and evaluated with Dr. Benjamin. The above note reflects our joint assessment and plan.   LIANA Reyes CNP  Pager: [phone]    EP STAFF NOTE  I have seen and examined the patient as part of a shared visit with DAVID Reyes NP.  I agree with the note above. I reviewed today's vital signs and medications. 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  My key history/exam  findings are: RRR.   The key management decisions made by me: f/u in 1 year.    Nav Benjamin MD [facility]  Cardiology - Electrophysiology    CC  ISAI SOSA

## 2017-11-02 ENCOUNTER — OFFICE VISIT (OUTPATIENT)
Dept: CARDIOLOGY | Facility: CLINIC | Age: 32
End: 2017-11-02
Attending: INTERNAL MEDICINE
Payer: COMMERCIAL

## 2017-11-02 VITALS
WEIGHT: 238.1 LBS | BODY MASS INDEX: 31.56 KG/M2 | DIASTOLIC BLOOD PRESSURE: 71 MMHG | SYSTOLIC BLOOD PRESSURE: 107 MMHG | OXYGEN SATURATION: 95 % | HEART RATE: 77 BPM | HEIGHT: 73 IN

## 2017-11-02 DIAGNOSIS — I42.8 ARRHYTHMOGENIC LEFT VENTRICULAR DYSPLASIA (H): Primary | ICD-10-CM

## 2017-11-02 LAB — INTERPRETATION ECG - MUSE: NORMAL

## 2017-11-02 PROCEDURE — 99214 OFFICE O/P EST MOD 30 MIN: CPT | Mod: ZP | Performed by: INTERNAL MEDICINE

## 2017-11-02 PROCEDURE — 99211 OFF/OP EST MAY X REQ PHY/QHP: CPT | Mod: ZF

## 2017-11-02 ASSESSMENT — PAIN SCALES - GENERAL: PAINLEVEL: NO PAIN (0)

## 2017-11-02 NOTE — PROGRESS NOTES
CC: Dr. Siva Lam:   [facility]   [place]     Dear Siva,     I had the pleasure of seeing Temo George in  [facility] clinic on 11/02/17.  As you know he is a pleasant 32 year old male who had an out of hospital cardiac arrest on 8/6/15 with a negative coronary CTA who then underwent secondary prevention SubQ ICD 8/20/2015. He has had a complicated course which I will summarize below but he is here to determine whether we can definitively assess for cardiac sarcoidosis and the develop a treatment plan going forward.     Shortly after his ICD placement he had recurrent VT causing LOC and therapy in October of 2016.  He was started on lidocaine gtt and Sotalol 120 mg PO BID. He was stabilized without recurrent VT and was discharged on Sotalol. He then came back for an outpatient EPS possible ablation on 9/4/15 which was non-inducible and no ablation was performed. [facility].     At that time he underwent a CMRI which showed evidence of subepicardial delayed enhancement along the inferior and inferolateral LV walls extending from base nearly to the apex. He subsequently had a PET in March 2016 which showed moderate perfusion abnormality involving inferior and inferolateral segments at the mid and basal level of LV with focal myocardial FDG uptake. Imaging was felt consistent with cardiac sarcoidosis.     At that time he was started on oral Prednisone, Bactrim, and Prilosec. He has gained about 20-30 lbs since being on steroids overall. A repeat PET in 9/2016 showed small mild inferior inferior and inferolateral perfusion abnormality and no FDG uptake. His prednisone has been tapered down at that time. Subsequent PET scans have demonstrated progression of the inferolateral, transmural scar.  Given these findings, this is not fully consistent with a diagnosis of sarcoidosis and potentially represent a diagnosis of left sided ARVC.    Of note, other  than when he first presented with a high VT burden, his EF has remains normal. He does not report any functional limitation however he is afraid to exert himself too much as this seems to correlate with times that that VT recurs.  He denies PND/orthopnea, chest pain, LE edema. He is now off the steroids, he feels well, he is back at work doing insurance.  He is not as active as he would like to be but has 2 kids at home and stays busy.      PAST MEDICAL HISTORY:  Past Medical History:   Diagnosis Date     Cardiac arrest (H)      Cardiac sarcoidosis 1/17/2017     H/O cardiac arrest 1/17/2017 8/2015     ICD (implantable cardioverter-defibrillator) in place 1/17/2017     Pulmonary embolism (H)     h/o that occurred in hospital     SVT (supraventricular tachycardia) (H)      V-tach (H) 1/17/2017   of note: SVT presented at 1 month old and then ultimately s/p ablation at 9 years       CURRENT MEDICATIONS:  Current Outpatient Prescriptions   Medication Sig Dispense Refill     predniSONE (DELTASONE) 5 MG tablet Take as directed one tab daily 14 tablet 0     omeprazole (PRILOSEC) 20 MG CR capsule 20 mg daily        sulfamethoxazole-trimethoprim (BACTRIM DS) 800-160 MG per tablet        sotalol (BETAPACE) 160 MG tablet Take 80 mg by mouth 2 times daily Take 2 tabs (80mg) twice daily       MAGNESIUM PO Magnesium Taurate 400 mg tab. 1 tablet by mouth two times a day.       PREDNISONE PO Take 20 mg by mouth daily         PAST SURGICAL HISTORY:  Past Surgical History:   Procedure Laterality Date     BRONCHOSCOPY RIGID OR FLEXIBLE W/TRANSENDOSCOPIC ENDOBRONCHIAL ULTRASOUND GUIDED N/A 1/18/2017    Procedure: BRONCHOSCOPY RIGID OR FLEXIBLE W/TRANSENDOSCOPIC ENDOBRONCHIAL ULTRASOUND GUIDED;  Surgeon: Jose Antonio Hogue MD;  Location: [facility] OR     EP ABLATION CHILD       s/p ICD placement      [employer] defibrillator       ALLERGIES:     Allergies   Allergen Reactions     Penicillins        FAMILY HISTORY:  There is no  "family history of sudden cardiac death or premature heart failure. They are unaware of any other family members with autoimmune disease.       SOCIAL HISTORY:  Social History   Substance Use Topics     Smoking status: Never Smoker     Smokeless tobacco: Not on file     Alcohol use Yes      Comment: rarely       ROS:   A comprehensive 10 point review of systems negative other than as mentioned in HPI.    Exam:  Ht 1.854 m (6' 1\")  Wt 108 kg (238 lb 1.6 oz)  BMI 31.41 kg/m2  GENERAL APPEARANCE: healthy, alert and no distress, obese, pleasant   HEENT: no icterus, no xanthelasmas, normal pupil size and reaction, normal palate, mucosa moist, no central cyanosis  NECK: no adenopathy, no asymmetry, masses, or scars, thyroid normal to palpation and no bruits, JVP not elevated  RESPIRATORY: lungs clear to auscultation - no rales, rhonchi or wheezes, no use of accessory muscles, no retractions, respirations are unlabored, normal respiratory rate  CARDIOVASCULAR: PMI focal,  regular rhythm, normal S1 with physiologic split S2, no S3 or S4 and no murmur, click or rub   ABDOMEN: soft, non tender, without hepatosplenomegaly, no masses palpable, bowel sounds normal, aorta not enlarged by palpation, no abdominal bruits  EXTREMITIES: peripheral pulses normal, no edema, no bruits  NEURO: alert and oriented to person/place/time, normal speech, gait and affect  VASC: Radial, femoral, dorsalis pedis and posterior tibialis pulses are normal in volumes and symmetric bilaterally. No bruits are heard.  SKIN: no ecchymoses, no rashes    Labs:  Reviewed.     Lab Results   Component Value Date     11/01/2017         Testing/Procedures:  12/2015 ECHOCARDIOGRAM (OSH REPORT):       2/2016 PET (OSH REPORT):      3/2016 PET (OSH REPORT):      PET 3/2017:  Impression:  1. The perfusion study demonstrate interval worsening of the perfusion  abnormality involving the inferolateral wall of left ventricle which  now appears to be transmural. " There is also increased reduction of  perfusion of the mid septum to almost transmural. The perfusion score  is 18, previous study did not provide a perfusion score.  2. No FDG localization within the heart to indicate active myocardial  sarcoid.  3. LV ejection fraction borderline low at 49%, previous examination  did not provide a LV ejection fraction.  4. Perfusion changes of the heart did not follow normal vascular  anatomy and thus is believed to be small vessel myocardial scarring.    ECG: sinus rhythm, isolated Q waves in III     Echo: 11/1/2017  Interpretation Summary  Global and regional left ventricular function is normal with an EF of 55-60%.  Left ventricular wall thickness is normal.  Left ventricular size is normal.  Right ventricular function, chamber size, wall motion, and thickness are  normal.  The inferior vena cava is normal.  No pericardial effusion is present.  Previous study not available for comparison.    Genetic testing :We do not have the results, but by patient report there was not a gene consistent with this diagnosis     Assessment and Plan:   In summary this is a very pleasant 32 year old male who has a past medical history significant an out of hospital cardiac arrest on 8/6/15 and a large burden of VT since that time who had a presumptive diagnosis of cardiac sarcoidosis based on imaging and a clinical syndrome that was consistent with this diagnosis. However, since this time he has had repeat PET scans and EP evaluation and given that the VT is monomorphic, scar related this is now felt to be more likely related to a left-sided arrhythmogenic cardiomyopathy.  Fortunately, since being on sotalol he has not had any limitations, recurrent ICD shocks (last 10/2016) and overall feels well and is back to working.  Fortunately, his EF has remained normal last checked 11/1/2017 and he has no evidence of heart failure.  He does not yet require neurohormonal blockade.  With regards to genetic  testing, he does state that he had this completed locally in [place].  We will request a copy of these records.    For his ICD, this was interrogated yesterday prior to his appointment with Dr. Benjamin.  He has not had any ICD shocks and has over 6 years left on his battery.    At this time, we will see him back as needed.  He will continue to follow locally with Dr. Lam and here with Dr. Benjamin.  Please feel free to contact me with any further questions and thank you so much for involving us in the care of Temo.         Sincerely,     Kaia Pino  [facility]

## 2017-11-02 NOTE — LETTER
11/2/2017      RE: Temo George  [address] [facility] [place] [address]       Dear Colleague,    Thank you for the opportunity to participate in the care of your patient, Temo George, at the [facility]. Please see a copy of my visit note below.    CC: Dr. Siva Lam:   [facility]     Dear Siva,     I had the pleasure of seeing Temo George in  [facility] Cardiology clinic on 11/02/17.  As you know he is a pleasant 32 year old male who had an out of hospital cardiac arrest on 8/6/15 with a negative coronary CTA who then underwent secondary prevention SubQ ICD 8/20/2015. He has had a complicated course which I will summarize below but he is here to determine whether we can definitively assess for cardiac sarcoidosis and the develop a treatment plan going forward.     Shortly after his ICD placement he had recurrent VT causing LOC and therapy in October of 2016.  He was started on lidocaine gtt and Sotalol 120 mg PO BID. He was stabilized without recurrent VT and was discharged on Sotalol. He then came back for an outpatient EPS possible ablation on 9/4/15 which was non-inducible and no ablation was performed. He was then referred to [facility] for evaluation.     At that time he underwent a CMRI which showed evidence of subepicardial delayed enhancement along the inferior and inferolateral LV walls extending from base nearly to the apex. He subsequently had a PET in March 2016 which showed moderate perfusion abnormality involving inferior and inferolateral segments at the mid and basal level of LV with focal myocardial FDG uptake. Imaging was felt consistent with cardiac sarcoidosis.     At that time he was started on oral Prednisone, Bactrim, and Prilosec. He has gained about 20-30 lbs since being on steroids overall. A repeat PET in 9/2016 showed small mild inferior inferior and inferolateral perfusion abnormality  and no FDG uptake. His prednisone has been tapered down at that time. Subsequent PET scans have demonstrated progression of the inferolateral, transmural scar.  Given these findings, this is not fully consistent with a diagnosis of sarcoidosis and potentially represent a diagnosis of left sided ARVC.    Of note, other than when he first presented with a high VT burden, his EF has remains normal. He does not report any functional limitation however he is afraid to exert himself too much as this seems to correlate with times that that VT recurs.  He denies PND/orthopnea, chest pain, LE edema. He is now off the steroids, he feels well, he is back at work doing insurance.  He is not as active as he would like to be but has 2 kids at home and stays busy.      PAST MEDICAL HISTORY:  Past Medical History:   Diagnosis Date     Cardiac arrest (H)      Cardiac sarcoidosis 1/17/2017     H/O cardiac arrest 1/17/2017 8/2015     ICD (implantable cardioverter-defibrillator) in place 1/17/2017     Pulmonary embolism (H)     h/o that occurred in hospital     SVT (supraventricular tachycardia) (H)      V-tach (H) 1/17/2017   of note: SVT presented at 1 month old and then ultimately s/p ablation at 9 years       CURRENT MEDICATIONS:  Current Outpatient Prescriptions   Medication Sig Dispense Refill     predniSONE (DELTASONE) 5 MG tablet Take as directed one tab daily 14 tablet 0     omeprazole (PRILOSEC) 20 MG CR capsule 20 mg daily        sulfamethoxazole-trimethoprim (BACTRIM DS) 800-160 MG per tablet        sotalol (BETAPACE) 160 MG tablet Take 80 mg by mouth 2 times daily Take 2 tabs (80mg) twice daily       MAGNESIUM PO Magnesium Taurate 400 mg tab. 1 tablet by mouth two times a day.       PREDNISONE PO Take 20 mg by mouth daily         PAST SURGICAL HISTORY:  Past Surgical History:   Procedure Laterality Date     BRONCHOSCOPY RIGID OR FLEXIBLE W/TRANSENDOSCOPIC ENDOBRONCHIAL ULTRASOUND GUIDED N/A 1/18/2017    Procedure:  "BRONCHOSCOPY RIGID OR FLEXIBLE W/TRANSENDOSCOPIC ENDOBRONCHIAL ULTRASOUND GUIDED;  Surgeon: Jose Antonio Hogue MD;  Location: [facility] OR     EP ABLATION CHILD       s/p ICD placement      [employer] defibrillator       ALLERGIES:     Allergies   Allergen Reactions     Penicillins        FAMILY HISTORY:  There is no family history of sudden cardiac death or premature heart failure. They are unaware of any other family members with autoimmune disease.       SOCIAL HISTORY:  Social History   Substance Use Topics     Smoking status: Never Smoker     Smokeless tobacco: Not on file     Alcohol use Yes      Comment: rarely       ROS:   A comprehensive 10 point review of systems negative other than as mentioned in HPI.    Exam:  Ht 1.854 m (6' 1\")  Wt 108 kg (238 lb 1.6 oz)  BMI 31.41 kg/m2  GENERAL APPEARANCE: healthy, alert and no distress, obese, pleasant   HEENT: no icterus, no xanthelasmas, normal pupil size and reaction, normal palate, mucosa moist, no central cyanosis  NECK: no adenopathy, no asymmetry, masses, or scars, thyroid normal to palpation and no bruits, JVP not elevated  RESPIRATORY: lungs clear to auscultation - no rales, rhonchi or wheezes, no use of accessory muscles, no retractions, respirations are unlabored, normal respiratory rate  CARDIOVASCULAR: PMI focal,  regular rhythm, normal S1 with physiologic split S2, no S3 or S4 and no murmur, click or rub   ABDOMEN: soft, non tender, without hepatosplenomegaly, no masses palpable, bowel sounds normal, aorta not enlarged by palpation, no abdominal bruits  EXTREMITIES: peripheral pulses normal, no edema, no bruits  NEURO: alert and oriented to person/place/time, normal speech, gait and affect  VASC: Radial, femoral, dorsalis pedis and posterior tibialis pulses are normal in volumes and symmetric bilaterally. No bruits are heard.  SKIN: no ecchymoses, no rashes    Labs:  Reviewed.     Lab Results   Component Value Date     11/01/2017 "         Testing/Procedures:  12/2015 ECHOCARDIOGRAM ([facility] REPORT):       2/2016 PET ([facility] REPORT):      3/2016 PET ([facility] REPORT):      PET 3/2017:  Impression:  1. The perfusion study demonstrate interval worsening of the perfusion  abnormality involving the inferolateral wall of left ventricle which  now appears to be transmural. There is also increased reduction of  perfusion of the mid septum to almost transmural. The perfusion score  is 18, previous study did not provide a perfusion score.  2. No FDG localization within the heart to indicate active myocardial  sarcoid.  3. LV ejection fraction borderline low at 49%, previous examination  did not provide a LV ejection fraction.  4. Perfusion changes of the heart did not follow normal vascular  anatomy and thus is believed to be small vessel myocardial scarring.    ECG: sinus rhythm, isolated Q waves in III     Echo: 11/1/2017  Interpretation Summary  Global and regional left ventricular function is normal with an EF of 55-60%.  Left ventricular wall thickness is normal.  Left ventricular size is normal.  Right ventricular function, chamber size, wall motion, and thickness are  normal.  The inferior vena cava is normal.  No pericardial effusion is present.  Previous study not available for comparison.    Genetic testing :We do not have the results, but by patient report there was not a gene consistent with this diagnosis     Assessment and Plan:   In summary this is a very pleasant 32 year old male who has a past medical history significant an out of hospital cardiac arrest on 8/6/15 and a large burden of VT since that time who had a presumptive diagnosis of cardiac sarcoidosis based on imaging and a clinical syndrome that was consistent with this diagnosis. However, since this time he has had repeat PET scans and EP evaluation and given that the VT is monomorphic, scar related this is now felt to be more likely related to a left-sided arrhythmogenic cardiomyopathy.   Fortunately, since being on sotalol he has not had any limitations, recurrent ICD shocks (last 10/2016) and overall feels well and is back to working.  Fortunately, his EF has remained normal last checked 11/1/2017 and he has no evidence of heart failure.  He does not yet require neurohormonal blockade.  With regards to genetic testing, he does state that he had this completed locally in [place].  We will request a copy of these records.    For his ICD, this was interrogated yesterday prior to his appointment with Dr. Benjamin.  He has not had any ICD shocks and has over 6 years left on his battery.    At this time, we will see him back as needed.  He will continue to follow locally with Dr. Lam and here with Dr. Benjamin.  Please feel free to contact me with any further questions and thank you so much for involving us in the care of Temo.         Sincerely,     Kaia Pino  [facility] Cardiology

## 2017-11-02 NOTE — PATIENT INSTRUCTIONS
You do not need to see Dr. Pino again unless there is a need for a transplant evaluation.     No medication changes.

## 2017-11-02 NOTE — MR AVS SNAPSHOT
"              After Visit Summary   11/2/2017    Temo George    MRN: [id number]           Patient Information     Date Of Birth          1985        Visit Information        Provider Department      11/2/2017 9:00 AM Kaia Pino MD [facility]        Care Instructions    You do not need to see Dr. Pino again unless there is a need for a transplant evaluation.     No medication changes.             Follow-ups after your visit        Who to contact     If you have questions or need follow up information about today's clinic visit or your schedule please contact [facility] directly at [phone].  Normal or non-critical lab and imaging results will be communicated to you by [employer], letter or phone within 4 business days after the clinic has received the results. If you do not hear from us within 7 days, please contact the clinic through [employer] or phone. If you have a critical or abnormal lab result, we will notify you by phone as soon as possible.  Submit refill requests through [employer] or call your pharmacy and they will forward the refill request to us. Please allow 3 business days for your refill to be completed.          Additional Information About Your Visit        MyChart Information     [employer] gives you secure access to your electronic health record. If you see a primary care provider, you can also send messages to your care team and make appointments. If you have questions, please call your primary care clinic.  If you do not have a primary care provider, please call [phone] and they will assist you.        Care EveryWhere ID     This is your Care EveryWhere ID. This could be used by other organizations to access your [facility] medical records  [id number]        Your Vitals Were     Pulse Height Pulse Oximetry BMI (Body Mass Index)          77 1.854 m (6' 1\") 95% 31.41 kg/m2         Blood Pressure from Last 3 Encounters:   11/02/17 107/71   11/01/17 108/70 "   01/18/17 107/81    Weight from Last 3 Encounters:   11/02/17 108 kg (238 lb 1.6 oz)   11/01/17 108.5 kg (239 lb 3.2 oz)   01/18/17 113.9 kg (251 lb 1.7 oz)              Today, you had the following     No orders found for display       Primary Care Provider Office Phone # Fax #    Jovanni Lam [phone]       [facility] HEART [facility] [address]  [facility] [address]        Equal Access to Services     [facility]: Hadii aad ku hadasho Soomaali, waaxda luqadaha, qaybta kaalmada adeegyada, waxsydney caba . So [facility] [place] [phone].    ATENCIÓN: Si habla español, tiene a dial disposición servicios gratuitos de asistencia lingüística. [facility] [phone].    We comply with applicable federal civil rights laws and [place] laws. We do not discriminate on the basis of race, color, national origin, age, disability, sex, sexual orientation, or gender identity.            Thank you!     Thank you for choosing [facility]  for your care. Our goal is always to provide you with excellent care. Hearing back from our patients is one way we can continue to improve our services. Please take a few minutes to complete the written survey that you may receive in the mail after your visit with us. Thank you!             Your Updated Medication List - Protect others around you: Learn how to safely use, store and throw away your medicines at www.disposemymeds.org.          This list is accurate as of: 11/2/17  9:47 AM.  Always use your most recent med list.                   Brand Name Dispense Instructions for use Diagnosis    BACTRIM -160 MG per tablet   Generic drug:  sulfamethoxazole-trimethoprim           MAGNESIUM PO      Magnesium Taurate 400 mg tab. 1 tablet by mouth two times a day.        omeprazole 20 MG CR capsule    priLOSEC     20 mg daily        * PREDNISONE PO      Take 20 mg by mouth daily        * predniSONE 5 MG tablet    DELTASONE    14 tablet    Take  as directed one tab daily    Cardiac sarcoidosis       sotalol 160 MG tablet    BETAPACE     Take 80 mg by mouth 2 times daily Take 2 tabs (80mg) twice daily        * Notice:  This list has 2 medication(s) that are the same as other medications prescribed for you. Read the directions carefully, and ask your doctor or other care provider to review them with you.

## 2017-11-02 NOTE — NURSING NOTE
Chief Complaint   Patient presents with     Follow Up For     RHF     Vitals were taken and medications were reconciled.     Catherine Mccray,ERIC  8:53 AM    
(0) understands/communicates without difficulty

## 2018-05-24 ENCOUNTER — MYC MEDICAL ADVICE (OUTPATIENT)
Dept: CARDIOLOGY | Facility: CLINIC | Age: 33
End: 2018-05-24

## 2018-06-04 ENCOUNTER — MYC MEDICAL ADVICE (OUTPATIENT)
Dept: CARDIOLOGY | Facility: CLINIC | Age: 33
End: 2018-06-04

## 2018-06-05 ENCOUNTER — MYC MEDICAL ADVICE (OUTPATIENT)
Dept: CARDIOLOGY | Facility: CLINIC | Age: 33
End: 2018-06-05

## 2018-10-26 ENCOUNTER — TRANSFERRED RECORDS (OUTPATIENT)
Dept: HEALTH INFORMATION MANAGEMENT | Facility: CLINIC | Age: 33
End: 2018-10-26

## 2019-02-04 ENCOUNTER — TRANSFERRED RECORDS (OUTPATIENT)
Dept: HEALTH INFORMATION MANAGEMENT | Facility: CLINIC | Age: 34
End: 2019-02-04

## 2019-02-04 ENCOUNTER — TELEPHONE (OUTPATIENT)
Dept: CARDIOLOGY | Facility: CLINIC | Age: 34
End: 2019-02-04

## 2019-02-04 NOTE — TELEPHONE ENCOUNTER
[facility] Call Center    Phone Message    May a detailed message be left on voicemail: yes    Reason for Call: Other: Dr. Da Lam at [facility] is requesting patient be seen by Dr. Pino. Per Dr. Genaro Glez nurse, patient was to be scheduled in November, 2018 and never received any call to schedule.  Please have Dr. Pino or nurse call [facility] at [phone] to discuss pt's recent hospitalization and appointment here.      Action Taken: Message routed to:  [facility] Cardiology

## 2019-02-04 NOTE — TELEPHONE ENCOUNTER
"Per Dr. Pino's last clinic note 11/2017: \"At this time, we will see him back as needed.  He will continue to follow locally with Dr. Lam and here with Dr. Benjamin. \"    Left a voicemail for nurse to call back to discuss.  "

## 2019-02-04 NOTE — TELEPHONE ENCOUNTER
[facility] Call Center    Phone Message    May a detailed message be left on voicemail: yes    Reason for Call: Other: Amaris returning call from [facility], please call her back to discuss.      Action Taken: Message routed to:  [facility] Cardiology

## 2019-02-05 NOTE — TELEPHONE ENCOUNTER
Left a voicemail for Amaris with the below information regarding follow up and asked that she calls back with questions.

## 2019-02-06 NOTE — TELEPHONE ENCOUNTER
Spoke with Amaris, she states that patient has had multiple shocks since October 2018. Dr. Lam spoke with Dr. Pino on Monday 2/4/19, she recommended follow up with Dr. Benjamin.  Amaris wanted to follow up on the status of an appointment with Dr. Benjamin.    Will send a message to his team.

## 2019-02-06 NOTE — TELEPHONE ENCOUNTER
[facility] Call Center    Phone Message    May a detailed message be left on voicemail: yes    Reason for Call: Other: Amaris called in to speak with Emre, please give her a call back at [phone]. Thank you!     Action Taken: Message routed to:  [facility] Cardiology

## 2019-02-11 NOTE — TELEPHONE ENCOUNTER
On Feb 7, I called the financial team and the patient. According to the financial team, the amount said to be due of the patient is what the insurance reported as deductible that hadn't been met.  I informed the patient of this response and explained to him that there is one part of the bill under review due to the same code being used twice. He may see a $50 refund if it is deemed that this code wasn't actually supposed to be used twice.     Patient understood and was going to be calling me on Monday to discuss dates for his ablation.     Bev Pacheco  Periop Electrophysiology   [phone]

## 2019-02-12 DIAGNOSIS — I47.29 PAROXYSMAL VENTRICULAR TACHYCARDIA (H): Primary | ICD-10-CM

## 2019-02-12 RX ORDER — LIDOCAINE 40 MG/G
CREAM TOPICAL
Status: CANCELLED | OUTPATIENT
Start: 2019-02-12

## 2019-02-14 NOTE — TELEPHONE ENCOUNTER
Waiting on approval for anesthesia for March 29.     Bev Pacheco  Periop Electrophysiology   [phone]

## 2019-03-14 ENCOUNTER — TELEPHONE (OUTPATIENT)
Dept: CARDIOLOGY | Facility: CLINIC | Age: 34
End: 2019-03-14

## 2019-03-14 NOTE — TELEPHONE ENCOUNTER
[facility] Call Center    Phone Message    May a detailed message be left on voicemail: yes    Reason for Call: Other: Amaris from Dr. Lam' office in [place] calling stating that pt has been receiving several shocks and needs to get in touch with one of Dr. Benjamin's nurses. Please call Amaris back at [phone]     Action Taken: Message routed to:  [facility]  CARDIOVASCULAR CTR

## 2019-03-14 NOTE — TELEPHONE ENCOUNTER
Returned Amaris's call.  She states that she just got off the phone with LIANA Reyes and faxed the device interrogation to her.  Amaris did mention that Temo had some questions regarding the prep prior to his ablation.      Call placed to Temo to discuss his concerns.  He states that he is concerned about being off his Sotolol for one week and wonders if that can be changed to have him take it as long as he could. He is worried that he will have some shocks from his device during his time off of the medication.  Will update Dr Benjamin with patient's concern.  Reviewed prep prior to patient's ablation, Temo denies any other further questions or concerns.    Oscar Clancy, RN  RN Care Coordinator  [facility]  [phone]

## 2019-03-28 ENCOUNTER — ANESTHESIA EVENT (OUTPATIENT)
Dept: CARDIOLOGY | Facility: CLINIC | Age: 34
End: 2019-03-28
Payer: COMMERCIAL

## 2019-03-29 ENCOUNTER — TELEPHONE (OUTPATIENT)
Dept: PEDIATRIC CARDIOLOGY | Facility: CLINIC | Age: 34
End: 2019-03-29

## 2019-03-29 ENCOUNTER — HOSPITAL ENCOUNTER (OUTPATIENT)
Facility: CLINIC | Age: 34
Setting detail: OBSERVATION
Discharge: HOME | End: 2019-03-30
Attending: INTERNAL MEDICINE | Admitting: INTERNAL MEDICINE
Payer: COMMERCIAL

## 2019-03-29 ENCOUNTER — APPOINTMENT (OUTPATIENT)
Dept: GENERAL RADIOLOGY | Facility: CLINIC | Age: 34
End: 2019-03-29
Payer: COMMERCIAL

## 2019-03-29 ENCOUNTER — ANESTHESIA (OUTPATIENT)
Dept: CARDIOLOGY | Facility: CLINIC | Age: 34
End: 2019-03-29
Payer: COMMERCIAL

## 2019-03-29 DIAGNOSIS — I47.29 PAROXYSMAL VENTRICULAR TACHYCARDIA (H): ICD-10-CM

## 2019-03-29 PROBLEM — R04.0 EPISTAXIS: Status: ACTIVE | Noted: 2019-03-29

## 2019-03-29 PROBLEM — Z86.79 S/P ABLATION OPERATION FOR ARRHYTHMIA: Status: ACTIVE | Noted: 2019-03-29

## 2019-03-29 PROBLEM — Z98.890 S/P ABLATION OPERATION FOR ARRHYTHMIA: Status: ACTIVE | Noted: 2019-03-29

## 2019-03-29 LAB
ABO + RH BLD: NORMAL
ABO + RH BLD: NORMAL
ANION GAP SERPL CALCULATED.3IONS-SCNC: 6 MMOL/L (ref 3–14)
BLD GP AB SCN SERPL QL: NORMAL
BLOOD BANK CMNT PATIENT-IMP: NORMAL
BUN SERPL-MCNC: 15 MG/DL (ref 7–30)
CA-I BLD-SCNC: 4.7 MG/DL (ref 4.4–5.2)
CALCIUM SERPL-MCNC: 8.2 MG/DL (ref 8.5–10.1)
CHLORIDE SERPL-SCNC: 107 MMOL/L (ref 94–109)
CO2 BLDCOV-SCNC: 24 MMOL/L (ref 21–28)
CO2 SERPL-SCNC: 26 MMOL/L (ref 20–32)
CREAT SERPL-MCNC: 0.83 MG/DL (ref 0.66–1.25)
ERYTHROCYTE [DISTWIDTH] IN BLOOD BY AUTOMATED COUNT: 12 % (ref 10–15)
GFR SERPL CREATININE-BSD FRML MDRD: >90 ML/MIN/{1.73_M2}
GLUCOSE BLD-MCNC: 105 MG/DL (ref 70–99)
GLUCOSE BLDC GLUCOMTR-MCNC: 100 MG/DL (ref 70–99)
GLUCOSE SERPL-MCNC: 96 MG/DL (ref 70–99)
HCT VFR BLD AUTO: 44.9 % (ref 40–53)
HCT VFR BLD CALC: 35 %PCV (ref 40–53)
HGB BLD CALC-MCNC: 11.9 G/DL (ref 13.3–17.7)
HGB BLD-MCNC: 14.8 G/DL (ref 13.3–17.7)
INR PPP: 1.05 (ref 0.86–1.14)
INTERPRETATION ECG - MUSE: NORMAL
KCT BLD-ACNC: 135 SEC (ref 75–150)
KCT BLD-ACNC: 233 SEC (ref 75–150)
KCT BLD-ACNC: 269 SEC (ref 75–150)
KCT BLD-ACNC: 269 SEC (ref 75–150)
KCT BLD-ACNC: 274 SEC (ref 75–150)
KCT BLD-ACNC: 282 SEC (ref 75–150)
KCT BLD-ACNC: 286 SEC (ref 75–150)
KCT BLD-ACNC: 294 SEC (ref 75–150)
KCT BLD-ACNC: 302 SEC (ref 75–150)
KCT BLD-ACNC: 306 SEC (ref 75–150)
KCT BLD-ACNC: 310 SEC (ref 75–150)
KCT BLD-ACNC: 318 SEC (ref 75–150)
KCT BLD-ACNC: 322 SEC (ref 75–150)
KCT BLD-ACNC: 335 SEC (ref 75–150)
KCT BLD-ACNC: 347 SEC (ref 75–150)
MAGNESIUM SERPL-MCNC: 2.1 MG/DL (ref 1.6–2.3)
MCH RBC QN AUTO: 30.4 PG (ref 26.5–33)
MCHC RBC AUTO-ENTMCNC: 33 G/DL (ref 31.5–36.5)
MCV RBC AUTO: 92 FL (ref 78–100)
PCO2 BLDV: 49 MM HG (ref 40–50)
PH BLDV: 7.3 PH (ref 7.32–7.43)
PLATELET # BLD AUTO: 144 10E9/L (ref 150–450)
PO2 BLDV: 71 MM HG (ref 25–47)
POTASSIUM BLD-SCNC: 3.3 MMOL/L (ref 3.4–5.3)
POTASSIUM SERPL-SCNC: 3.7 MMOL/L (ref 3.4–5.3)
RBC # BLD AUTO: 4.87 10E12/L (ref 4.4–5.9)
SAO2 % BLDV FROM PO2: 92 %
SODIUM BLD-SCNC: 142 MMOL/L (ref 133–144)
SODIUM SERPL-SCNC: 140 MMOL/L (ref 133–144)
SPECIMEN EXP DATE BLD: NORMAL
WBC # BLD AUTO: 5.8 10E9/L (ref 4–11)

## 2019-03-29 PROCEDURE — 25000128 H RX IP 250 OP 636: Performed by: NURSE ANESTHETIST, CERTIFIED REGISTERED

## 2019-03-29 PROCEDURE — 93010 ELECTROCARDIOGRAM REPORT: CPT | Performed by: INTERNAL MEDICINE

## 2019-03-29 PROCEDURE — 84132 ASSAY OF SERUM POTASSIUM: CPT

## 2019-03-29 PROCEDURE — C1894 INTRO/SHEATH, NON-LASER: HCPCS | Performed by: INTERNAL MEDICINE

## 2019-03-29 PROCEDURE — 25800030 ZZH RX IP 258 OP 636: Performed by: NURSE ANESTHETIST, CERTIFIED REGISTERED

## 2019-03-29 PROCEDURE — 85610 PROTHROMBIN TIME: CPT | Performed by: NURSE PRACTITIONER

## 2019-03-29 PROCEDURE — 93654 COMPRE EP EVAL TX VT: CPT | Performed by: INTERNAL MEDICINE

## 2019-03-29 PROCEDURE — 25800030 ZZH RX IP 258 OP 636

## 2019-03-29 PROCEDURE — 86850 RBC ANTIBODY SCREEN: CPT | Performed by: NURSE PRACTITIONER

## 2019-03-29 PROCEDURE — 25000132 ZZH RX MED GY IP 250 OP 250 PS 637

## 2019-03-29 PROCEDURE — 83735 ASSAY OF MAGNESIUM: CPT | Performed by: INTERNAL MEDICINE

## 2019-03-29 PROCEDURE — 25000128 H RX IP 250 OP 636

## 2019-03-29 PROCEDURE — 82803 BLOOD GASES ANY COMBINATION: CPT

## 2019-03-29 PROCEDURE — 25000128 H RX IP 250 OP 636: Performed by: ANESTHESIOLOGY

## 2019-03-29 PROCEDURE — C1730 CATH, EP, 19 OR FEW ELECT: HCPCS | Performed by: INTERNAL MEDICINE

## 2019-03-29 PROCEDURE — 25000125 ZZHC RX 250: Performed by: NURSE PRACTITIONER

## 2019-03-29 PROCEDURE — 85014 HEMATOCRIT: CPT

## 2019-03-29 PROCEDURE — 25000125 ZZHC RX 250: Performed by: NURSE ANESTHETIST, CERTIFIED REGISTERED

## 2019-03-29 PROCEDURE — 25000132 ZZH RX MED GY IP 250 OP 250 PS 637: Performed by: INTERNAL MEDICINE

## 2019-03-29 PROCEDURE — 86900 BLOOD TYPING SEROLOGIC ABO: CPT | Performed by: NURSE PRACTITIONER

## 2019-03-29 PROCEDURE — 82947 ASSAY GLUCOSE BLOOD QUANT: CPT

## 2019-03-29 PROCEDURE — 25000125 ZZHC RX 250: Performed by: INTERNAL MEDICINE

## 2019-03-29 PROCEDURE — C1887 CATHETER, GUIDING: HCPCS | Performed by: INTERNAL MEDICINE

## 2019-03-29 PROCEDURE — 80048 BASIC METABOLIC PNL TOTAL CA: CPT | Performed by: NURSE PRACTITIONER

## 2019-03-29 PROCEDURE — C1733 CATH, EP, OTHR THAN COOL-TIP: HCPCS | Performed by: INTERNAL MEDICINE

## 2019-03-29 PROCEDURE — 00000146 ZZHCL STATISTIC GLUCOSE BY METER IP

## 2019-03-29 PROCEDURE — 40000065 ZZH STATISTIC EKG NON-CHARGEABLE

## 2019-03-29 PROCEDURE — G0378 HOSPITAL OBSERVATION PER HR: HCPCS

## 2019-03-29 PROCEDURE — 82330 ASSAY OF CALCIUM: CPT

## 2019-03-29 PROCEDURE — 37000009 ZZH ANESTHESIA TECHNICAL FEE, EACH ADDTL 15 MIN: Performed by: INTERNAL MEDICINE

## 2019-03-29 PROCEDURE — 36415 COLL VENOUS BLD VENIPUNCTURE: CPT | Performed by: NURSE PRACTITIONER

## 2019-03-29 PROCEDURE — 93005 ELECTROCARDIOGRAM TRACING: CPT

## 2019-03-29 PROCEDURE — 25000565 ZZH ISOFLURANE, EA 15 MIN: Performed by: INTERNAL MEDICINE

## 2019-03-29 PROCEDURE — 27210794 ZZH OR GENERAL SUPPLY STERILE: Performed by: INTERNAL MEDICINE

## 2019-03-29 PROCEDURE — 93623 PRGRMD STIMJ&PACG IV RX NFS: CPT | Performed by: INTERNAL MEDICINE

## 2019-03-29 PROCEDURE — 93662 INTRACARDIAC ECG (ICE): CPT | Mod: 26 | Performed by: INTERNAL MEDICINE

## 2019-03-29 PROCEDURE — 93462 L HRT CATH TRNSPTL PUNCTURE: CPT | Performed by: INTERNAL MEDICINE

## 2019-03-29 PROCEDURE — 83735 ASSAY OF MAGNESIUM: CPT | Performed by: NURSE PRACTITIONER

## 2019-03-29 PROCEDURE — 93623 PRGRMD STIMJ&PACG IV RX NFS: CPT | Mod: 26 | Performed by: INTERNAL MEDICINE

## 2019-03-29 PROCEDURE — 71045 X-RAY EXAM CHEST 1 VIEW: CPT

## 2019-03-29 PROCEDURE — 85027 COMPLETE CBC AUTOMATED: CPT | Performed by: NURSE PRACTITIONER

## 2019-03-29 PROCEDURE — C1759 CATH, INTRA ECHOCARDIOGRAPHY: HCPCS | Performed by: INTERNAL MEDICINE

## 2019-03-29 PROCEDURE — 93662 INTRACARDIAC ECG (ICE): CPT | Performed by: INTERNAL MEDICINE

## 2019-03-29 PROCEDURE — 25800030 ZZH RX IP 258 OP 636: Performed by: INTERNAL MEDICINE

## 2019-03-29 PROCEDURE — 37000008 ZZH ANESTHESIA TECHNICAL FEE, 1ST 30 MIN: Performed by: INTERNAL MEDICINE

## 2019-03-29 PROCEDURE — 84295 ASSAY OF SERUM SODIUM: CPT

## 2019-03-29 PROCEDURE — 86901 BLOOD TYPING SEROLOGIC RH(D): CPT | Performed by: NURSE PRACTITIONER

## 2019-03-29 PROCEDURE — 99214 OFFICE O/P EST MOD 30 MIN: CPT | Mod: 24 | Performed by: INTERNAL MEDICINE

## 2019-03-29 PROCEDURE — 25800030 ZZH RX IP 258 OP 636: Performed by: ANESTHESIOLOGY

## 2019-03-29 PROCEDURE — 85347 COAGULATION TIME ACTIVATED: CPT

## 2019-03-29 RX ORDER — NALOXONE HYDROCHLORIDE 0.4 MG/ML
.1-.4 INJECTION, SOLUTION INTRAMUSCULAR; INTRAVENOUS; SUBCUTANEOUS
Status: DISCONTINUED | OUTPATIENT
Start: 2019-03-29 | End: 2019-03-30 | Stop reason: HOSPADM

## 2019-03-29 RX ORDER — LIDOCAINE 40 MG/G
CREAM TOPICAL
Status: DISCONTINUED | OUTPATIENT
Start: 2019-03-29 | End: 2019-03-29 | Stop reason: HOSPADM

## 2019-03-29 RX ORDER — ACETAMINOPHEN 325 MG/1
650 TABLET ORAL EVERY 4 HOURS PRN
Status: DISCONTINUED | OUTPATIENT
Start: 2019-03-29 | End: 2019-03-30 | Stop reason: HOSPADM

## 2019-03-29 RX ORDER — ACETAMINOPHEN 650 MG/1
650 SUPPOSITORY RECTAL EVERY 4 HOURS PRN
Status: DISCONTINUED | OUTPATIENT
Start: 2019-03-29 | End: 2019-03-30 | Stop reason: HOSPADM

## 2019-03-29 RX ORDER — OXYCODONE AND ACETAMINOPHEN 5; 325 MG/1; MG/1
1 TABLET ORAL EVERY 4 HOURS PRN
Status: DISCONTINUED | OUTPATIENT
Start: 2019-03-29 | End: 2019-03-30 | Stop reason: HOSPADM

## 2019-03-29 RX ORDER — LIDOCAINE 40 MG/G
CREAM TOPICAL
Status: DISCONTINUED | OUTPATIENT
Start: 2019-03-29 | End: 2019-03-30 | Stop reason: HOSPADM

## 2019-03-29 RX ORDER — ONDANSETRON 2 MG/ML
4 INJECTION INTRAMUSCULAR; INTRAVENOUS EVERY 30 MIN PRN
Status: DISCONTINUED | OUTPATIENT
Start: 2019-03-29 | End: 2019-03-29 | Stop reason: HOSPADM

## 2019-03-29 RX ORDER — FLUMAZENIL 0.1 MG/ML
0.2 INJECTION, SOLUTION INTRAVENOUS
Status: ACTIVE | OUTPATIENT
Start: 2019-03-29 | End: 2019-03-30

## 2019-03-29 RX ORDER — ONDANSETRON 4 MG/1
4 TABLET, ORALLY DISINTEGRATING ORAL EVERY 30 MIN PRN
Status: DISCONTINUED | OUTPATIENT
Start: 2019-03-29 | End: 2019-03-29 | Stop reason: HOSPADM

## 2019-03-29 RX ORDER — HYDROMORPHONE HYDROCHLORIDE 1 MG/ML
.3-.5 INJECTION, SOLUTION INTRAMUSCULAR; INTRAVENOUS; SUBCUTANEOUS EVERY 5 MIN PRN
Status: DISCONTINUED | OUTPATIENT
Start: 2019-03-29 | End: 2019-03-29 | Stop reason: HOSPADM

## 2019-03-29 RX ORDER — FENTANYL CITRATE 50 UG/ML
25-50 INJECTION, SOLUTION INTRAMUSCULAR; INTRAVENOUS
Status: DISCONTINUED | OUTPATIENT
Start: 2019-03-29 | End: 2019-03-29 | Stop reason: HOSPADM

## 2019-03-29 RX ORDER — AMOXICILLIN 250 MG
2 CAPSULE ORAL 2 TIMES DAILY PRN
Status: DISCONTINUED | OUTPATIENT
Start: 2019-03-29 | End: 2019-03-30 | Stop reason: HOSPADM

## 2019-03-29 RX ORDER — OXYMETAZOLINE HYDROCHLORIDE 0.05 G/100ML
SPRAY NASAL PRN
Status: DISCONTINUED | OUTPATIENT
Start: 2019-03-29 | End: 2019-03-29

## 2019-03-29 RX ORDER — SOTALOL HYDROCHLORIDE 80 MG/1
80 TABLET ORAL 2 TIMES DAILY
Status: DISCONTINUED | OUTPATIENT
Start: 2019-03-29 | End: 2019-03-29 | Stop reason: DRUGHIGH

## 2019-03-29 RX ORDER — CALCIUM CHLORIDE 100 MG/ML
INJECTION INTRAVENOUS; INTRAVENTRICULAR PRN
Status: DISCONTINUED | OUTPATIENT
Start: 2019-03-29 | End: 2019-03-29

## 2019-03-29 RX ORDER — AMOXICILLIN 250 MG
1 CAPSULE ORAL 2 TIMES DAILY PRN
Status: DISCONTINUED | OUTPATIENT
Start: 2019-03-29 | End: 2019-03-30 | Stop reason: HOSPADM

## 2019-03-29 RX ORDER — SODIUM CHLORIDE, SODIUM LACTATE, POTASSIUM CHLORIDE, CALCIUM CHLORIDE 600; 310; 30; 20 MG/100ML; MG/100ML; MG/100ML; MG/100ML
INJECTION, SOLUTION INTRAVENOUS CONTINUOUS PRN
Status: DISCONTINUED | OUTPATIENT
Start: 2019-03-29 | End: 2019-03-29

## 2019-03-29 RX ORDER — ATROPINE SULFATE 0.1 MG/ML
0.5 INJECTION INTRAVENOUS EVERY 5 MIN PRN
Status: ACTIVE | OUTPATIENT
Start: 2019-03-29 | End: 2019-03-30

## 2019-03-29 RX ORDER — HEPARIN SODIUM 1000 [USP'U]/ML
INJECTION, SOLUTION INTRAVENOUS; SUBCUTANEOUS PRN
Status: DISCONTINUED | OUTPATIENT
Start: 2019-03-29 | End: 2019-03-29

## 2019-03-29 RX ORDER — CLINDAMYCIN PHOSPHATE 600 MG/50ML
INJECTION, SOLUTION INTRAVENOUS PRN
Status: DISCONTINUED | OUTPATIENT
Start: 2019-03-29 | End: 2019-03-29

## 2019-03-29 RX ORDER — SODIUM CHLORIDE, SODIUM LACTATE, POTASSIUM CHLORIDE, CALCIUM CHLORIDE 600; 310; 30; 20 MG/100ML; MG/100ML; MG/100ML; MG/100ML
INJECTION, SOLUTION INTRAVENOUS CONTINUOUS
Status: DISCONTINUED | OUTPATIENT
Start: 2019-03-29 | End: 2019-03-29 | Stop reason: HOSPADM

## 2019-03-29 RX ORDER — SOTALOL HYDROCHLORIDE 160 MG/1
160 TABLET ORAL EVERY 12 HOURS SCHEDULED
Status: DISCONTINUED | OUTPATIENT
Start: 2019-03-29 | End: 2019-03-30 | Stop reason: HOSPADM

## 2019-03-29 RX ORDER — ONDANSETRON 2 MG/ML
4 INJECTION INTRAMUSCULAR; INTRAVENOUS EVERY 6 HOURS PRN
Status: DISCONTINUED | OUTPATIENT
Start: 2019-03-29 | End: 2019-03-30 | Stop reason: HOSPADM

## 2019-03-29 RX ORDER — PROPOFOL 10 MG/ML
INJECTION, EMULSION INTRAVENOUS CONTINUOUS PRN
Status: DISCONTINUED | OUTPATIENT
Start: 2019-03-29 | End: 2019-03-29

## 2019-03-29 RX ORDER — FUROSEMIDE 10 MG/ML
INJECTION INTRAMUSCULAR; INTRAVENOUS PRN
Status: DISCONTINUED | OUTPATIENT
Start: 2019-03-29 | End: 2019-03-29

## 2019-03-29 RX ORDER — FENTANYL CITRATE 50 UG/ML
INJECTION, SOLUTION INTRAMUSCULAR; INTRAVENOUS PRN
Status: DISCONTINUED | OUTPATIENT
Start: 2019-03-29 | End: 2019-03-29

## 2019-03-29 RX ORDER — PROPOFOL 10 MG/ML
INJECTION, EMULSION INTRAVENOUS PRN
Status: DISCONTINUED | OUTPATIENT
Start: 2019-03-29 | End: 2019-03-29

## 2019-03-29 RX ORDER — POTASSIUM CHLORIDE 1.5 G/1.58G
40 POWDER, FOR SOLUTION ORAL ONCE
Status: COMPLETED | OUTPATIENT
Start: 2019-03-29 | End: 2019-03-29

## 2019-03-29 RX ORDER — FENTANYL CITRATE 50 UG/ML
25-50 INJECTION, SOLUTION INTRAMUSCULAR; INTRAVENOUS
Status: ACTIVE | OUTPATIENT
Start: 2019-03-29 | End: 2019-03-30

## 2019-03-29 RX ORDER — PROTAMINE SULFATE 10 MG/ML
INJECTION, SOLUTION INTRAVENOUS PRN
Status: DISCONTINUED | OUTPATIENT
Start: 2019-03-29 | End: 2019-03-29

## 2019-03-29 RX ORDER — NALOXONE HYDROCHLORIDE 0.4 MG/ML
.2-.4 INJECTION, SOLUTION INTRAMUSCULAR; INTRAVENOUS; SUBCUTANEOUS
Status: ACTIVE | OUTPATIENT
Start: 2019-03-29 | End: 2019-03-30

## 2019-03-29 RX ADMIN — OXYCODONE HYDROCHLORIDE AND ACETAMINOPHEN 1 TABLET: 5; 325 TABLET ORAL at 23:22

## 2019-03-29 RX ADMIN — CALCIUM CHLORIDE 500 MG: 100 INJECTION, SOLUTION INTRAVENOUS at 12:00

## 2019-03-29 RX ADMIN — HEPARIN SODIUM 5000 UNITS: 1000 INJECTION, SOLUTION INTRAVENOUS; SUBCUTANEOUS at 17:18

## 2019-03-29 RX ADMIN — HEPARIN SODIUM 4000 UNITS: 1000 INJECTION, SOLUTION INTRAVENOUS; SUBCUTANEOUS at 14:24

## 2019-03-29 RX ADMIN — FENTANYL CITRATE 25 MCG: 50 INJECTION INTRAMUSCULAR; INTRAVENOUS at 21:12

## 2019-03-29 RX ADMIN — ROCURONIUM BROMIDE 20 MG: 10 INJECTION INTRAVENOUS at 13:07

## 2019-03-29 RX ADMIN — ROCURONIUM BROMIDE 20 MG: 10 INJECTION INTRAVENOUS at 12:29

## 2019-03-29 RX ADMIN — OXYMETAZOLINE HYDROCHLORIDE 3 SPRAY: 0.05 SPRAY NASAL at 18:49

## 2019-03-29 RX ADMIN — ROCURONIUM BROMIDE 20 MG: 10 INJECTION INTRAVENOUS at 16:55

## 2019-03-29 RX ADMIN — FUROSEMIDE 10 MG: 10 INJECTION, SOLUTION INTRAVENOUS at 18:35

## 2019-03-29 RX ADMIN — HEPARIN SODIUM 3000 UNITS: 1000 INJECTION, SOLUTION INTRAVENOUS; SUBCUTANEOUS at 16:21

## 2019-03-29 RX ADMIN — PHENYLEPHRINE HYDROCHLORIDE 100 MCG: 10 INJECTION, SOLUTION INTRAMUSCULAR; INTRAVENOUS; SUBCUTANEOUS at 14:53

## 2019-03-29 RX ADMIN — ROCURONIUM BROMIDE 20 MG: 10 INJECTION INTRAVENOUS at 18:02

## 2019-03-29 RX ADMIN — ROCURONIUM BROMIDE 20 MG: 10 INJECTION INTRAVENOUS at 16:07

## 2019-03-29 RX ADMIN — CLINDAMYCIN IN 5 PERCENT DEXTROSE 600 MG: 12 INJECTION, SOLUTION INTRAVENOUS at 16:58

## 2019-03-29 RX ADMIN — ROCURONIUM BROMIDE 20 MG: 10 INJECTION INTRAVENOUS at 15:37

## 2019-03-29 RX ADMIN — CLINDAMYCIN IN 5 PERCENT DEXTROSE 600 MG: 12 INJECTION, SOLUTION INTRAVENOUS at 10:57

## 2019-03-29 RX ADMIN — PROPOFOL 30 MG: 10 INJECTION, EMULSION INTRAVENOUS at 10:56

## 2019-03-29 RX ADMIN — ONDANSETRON 4 MG: 2 INJECTION INTRAMUSCULAR; INTRAVENOUS at 22:17

## 2019-03-29 RX ADMIN — ROCURONIUM BROMIDE 10 MG: 10 INJECTION INTRAVENOUS at 17:30

## 2019-03-29 RX ADMIN — SODIUM CHLORIDE, POTASSIUM CHLORIDE, SODIUM LACTATE AND CALCIUM CHLORIDE: 600; 310; 30; 20 INJECTION, SOLUTION INTRAVENOUS at 10:32

## 2019-03-29 RX ADMIN — POTASSIUM CHLORIDE 40 MEQ: 1.5 POWDER, FOR SOLUTION ORAL at 23:48

## 2019-03-29 RX ADMIN — ROCURONIUM BROMIDE 20 MG: 10 INJECTION INTRAVENOUS at 15:07

## 2019-03-29 RX ADMIN — HEPARIN SODIUM 10000 UNITS: 1000 INJECTION, SOLUTION INTRAVENOUS; SUBCUTANEOUS at 13:12

## 2019-03-29 RX ADMIN — PROPOFOL 100 MCG/KG/MIN: 10 INJECTION, EMULSION INTRAVENOUS at 10:48

## 2019-03-29 RX ADMIN — HEPARIN SODIUM 3000 UNITS: 1000 INJECTION, SOLUTION INTRAVENOUS; SUBCUTANEOUS at 13:36

## 2019-03-29 RX ADMIN — FENTANYL CITRATE 25 MCG: 50 INJECTION INTRAMUSCULAR; INTRAVENOUS at 21:36

## 2019-03-29 RX ADMIN — CALCIUM CHLORIDE 500 MG: 100 INJECTION, SOLUTION INTRAVENOUS at 13:31

## 2019-03-29 RX ADMIN — PROPOFOL 140 MG: 10 INJECTION, EMULSION INTRAVENOUS at 11:52

## 2019-03-29 RX ADMIN — PHENYLEPHRINE HYDROCHLORIDE 100 MCG: 10 INJECTION, SOLUTION INTRAMUSCULAR; INTRAVENOUS; SUBCUTANEOUS at 15:40

## 2019-03-29 RX ADMIN — ROCURONIUM BROMIDE 20 MG: 10 INJECTION INTRAVENOUS at 17:42

## 2019-03-29 RX ADMIN — AMINOPHYLLINE 100 ML: 25 INJECTION, SOLUTION INTRAVENOUS at 12:58

## 2019-03-29 RX ADMIN — FENTANYL CITRATE 50 MCG: 50 INJECTION, SOLUTION INTRAMUSCULAR; INTRAVENOUS at 18:05

## 2019-03-29 RX ADMIN — SODIUM CHLORIDE, POTASSIUM CHLORIDE, SODIUM LACTATE AND CALCIUM CHLORIDE: 600; 310; 30; 20 INJECTION, SOLUTION INTRAVENOUS at 14:21

## 2019-03-29 RX ADMIN — ROCURONIUM BROMIDE 20 MG: 10 INJECTION INTRAVENOUS at 14:05

## 2019-03-29 RX ADMIN — SODIUM CHLORIDE, POTASSIUM CHLORIDE, SODIUM LACTATE AND CALCIUM CHLORIDE: 600; 310; 30; 20 INJECTION, SOLUTION INTRAVENOUS at 21:36

## 2019-03-29 RX ADMIN — PROPOFOL 30 MG: 10 INJECTION, EMULSION INTRAVENOUS at 10:54

## 2019-03-29 RX ADMIN — MIDAZOLAM 2 MG: 1 INJECTION INTRAMUSCULAR; INTRAVENOUS at 10:40

## 2019-03-29 RX ADMIN — PROTAMINE SULFATE 50 MG: 10 INJECTION, SOLUTION INTRAVENOUS at 18:33

## 2019-03-29 RX ADMIN — HEPARIN SODIUM 5000 UNITS: 1000 INJECTION, SOLUTION INTRAVENOUS; SUBCUTANEOUS at 13:24

## 2019-03-29 RX ADMIN — HEPARIN SODIUM 3000 UNITS: 1000 INJECTION, SOLUTION INTRAVENOUS; SUBCUTANEOUS at 15:37

## 2019-03-29 RX ADMIN — SOTALOL HYDROCHLORIDE 160 MG: 160 TABLET ORAL at 23:49

## 2019-03-29 RX ADMIN — SODIUM CHLORIDE, POTASSIUM CHLORIDE, SODIUM LACTATE AND CALCIUM CHLORIDE: 600; 310; 30; 20 INJECTION, SOLUTION INTRAVENOUS at 18:56

## 2019-03-29 RX ADMIN — FENTANYL CITRATE 50 MCG: 50 INJECTION, SOLUTION INTRAMUSCULAR; INTRAVENOUS at 10:56

## 2019-03-29 RX ADMIN — ROCURONIUM BROMIDE 50 MG: 10 INJECTION INTRAVENOUS at 11:52

## 2019-03-29 RX ADMIN — MIDAZOLAM 2 MG: 1 INJECTION INTRAMUSCULAR; INTRAVENOUS at 10:32

## 2019-03-29 ASSESSMENT — MIFFLIN-ST. JEOR: SCORE: 2063.88

## 2019-03-29 ASSESSMENT — PAIN DESCRIPTION - DESCRIPTORS: DESCRIPTORS: ACHING;DISCOMFORT;SHARP;SORE

## 2019-03-29 NOTE — Clinical Note
Arrhythmia Type: monomorphic VT.   Method of Cardioversion: synchronous.   The arrhythmia was terminated.   Energy shock delivered: 200 joules.   Post cardioversion rhythm: sinus rhythm.

## 2019-03-29 NOTE — H&P
History & Physical Exam    Name: Temo George MRN: [id number]     Age: 34 year old   YOB: 1985     Date of Admisson: 3/29/2019            HPI:   CC: Presenting for planned VT ablation    34 year old male who has a past medical history significant for SVT started at 1 mo age and then ultimately s/p ablation at 9 years old by Dr. Lynn, out of hospital [facility] on 8/6/15 from VT Storm work up negative s/p secondary prevention SubQ ICD 8/20/2015 with recurrent ICD shocks on 8/29/16, EPS at [place] 9/2015 non-inducible no ablation performed,  ICD shocks X3 October 2016, and PE when hospitalized thought to have ALVC. He presents today for planned VT ablation.              Past Medical History:     Past Medical History:   Diagnosis Date     Cardiac arrest (H)      Cardiac sarcoidosis 1/17/2017     H/O cardiac arrest 1/17/2017 8/2015     ICD (implantable cardioverter-defibrillator) in place 1/17/2017     Pulmonary embolism (H)     h/o that occurred in hospital     SVT (supraventricular tachycardia) (H)      V-tach (H) 1/17/2017             Past Surgical History:      Past Surgical History:   Procedure Laterality Date     BRONCHOSCOPY RIGID OR FLEXIBLE W/TRANSENDOSCOPIC ENDOBRONCHIAL ULTRASOUND GUIDED N/A 1/18/2017    Procedure: BRONCHOSCOPY RIGID OR FLEXIBLE W/TRANSENDOSCOPIC ENDOBRONCHIAL ULTRASOUND GUIDED;  Surgeon: Jose Antonio Hogue MD;  Location: [facility] OR     EP ABLATION CHILD       s/p ICD placement      [employer] defibrillator                 Family History:   History reviewed. No pertinent family history.          Allergies:     Allergies   Allergen Reactions     Penicillins              Medications:     Medications Prior to Admission   Medication Sig Dispense Refill Last Dose     MAGNESIUM PO Magnesium Taurate 400 mg tab. 1 tablet by mouth two times a day.   3/29/2019 at 0445     omeprazole (PRILOSEC) 20 MG CR capsule 20 mg daily    Taking     predniSONE (DELTASONE) 5 MG tablet  "Take as directed one tab daily 14 tablet 0 Taking     PREDNISONE PO Take 20 mg by mouth daily   Taking     sotalol (BETAPACE) 160 MG tablet Take 80 mg by mouth 2 times daily Take 2 tabs (80mg) twice daily   3/27/2019     sulfamethoxazole-trimethoprim (BACTRIM DS) 800-160 MG per tablet    Taking             Review of Systems:   GEN: Denies fevers, shaking chills, night sweats, decreased appetite, weight loss/gain  EYES: Denies blurry or double vision  EARS/NOSE/THROAT: Denies sore throat, dysphagia  RESP: Denies exertional shortness of breath, productive cough, hemoptysis  CARDIO: Denies chest pain, palpitations, lower extremity edema  ABD: Denies nausea, vomiting, heart burn, abdominal pain, constipation, diarrhea, melena  : Denies dysuria or hematuria  MSK: Denies significant fatigue or muscle aches  NEURO: Denies headache, numbness/tingling, weakness  HEME: Denies bruising, bleeding petechiae   ENDO: Denies heat/cold intolerance, polyuria, polydipsia  **Remainder of the ROS is negative except that commented on in HPI.         Exam:   /75 (BP Location: Left arm)   Pulse 67   Temp 98.1  F (36.7  C) (Oral)   Resp 16   Ht 1.854 m (6' 1\")   Wt 107 kg (235 lb 14.3 oz)   SpO2 98%   BMI 31.12 kg/m    GEN: A & O, healthy appearing male, resting comfortably in bed, NAD, cooperative and conversational   HEENT: PERRL, no scleral icterus, mucus membranes moist  NECK: Supple, JVP not elevated   RESP: CTA bilaterally, no wheezing, no crackles, no increased work of breathing   CV: Regular, normal rate, S1 and S2 without m/r/g   ABD: Soft, nontender to palpation, no HSM, +BS, no guarding  EXT: No peripheral edema, warm/well perfused   NEURO: CN II-XII intact, normal 5/5 strength in all extremitites  SKIN: Normal skin turgor, no rash on limited exam         Data:   ROUTINE ICU LABS (Last four results)  CMP  Recent Labs   Lab 03/29/19  0656      POTASSIUM 3.7   CHLORIDE 107   CO2 26   ANIONGAP 6   GLC 96   BUN " 15   CR 0.83   GFRESTIMATED >90   GFRESTBLACK >90   REGULO 8.2*     CBC  Recent Labs   Lab 03/29/19  0656   WBC 5.8   RBC 4.87   HGB 14.8   HCT 44.9   MCV 92   MCH 30.4   MCHC 33.0   RDW 12.0   *     INR  Recent Labs   Lab 03/29/19  0656   INR 1.05     Arterial Blood GasNo lab results found in last 7 days.         Imaging:     Echocardiogram:  11/2017  Global and regional left ventricular function is normal with an EF of 55-60%.  Left ventricular wall thickness is normal.  Left ventricular size is normal.  Right ventricular function, chamber size, wall motion, and thickness are  normal.  The inferior vena cava is normal.  No pericardial effusion is present.  Previous study not available for comparison.             Assessment and Plan:   Assessment and Recs:  - 32 year old male who has a past medical history significant for SVT started at 1 mo age and then ultimately s/p ablation at 9 years old by Dr. Lynn, out of hospital [facility] on 8/6/15 from VT Storm work up negative s/p secondary prevention SubQ ICD 8/20/2015 with recurrent ICD shocks on 8/29/16, EPS at [place] 9/2015 non-inducible no ablation performed,  ICD shocks X3 October 2016, and PE when hospitalized thought to have ALVC. He presents today for planned VT ablation.     - Risks and benefits of the procedure discussed with the patient, all questions answered. He is in agreement with proceeding.       Patient seen and discussed with Dr. Sourav Dubois MD  Cardiology Fellow  [phone]

## 2019-03-30 ENCOUNTER — APPOINTMENT (OUTPATIENT)
Dept: GENERAL RADIOLOGY | Facility: CLINIC | Age: 34
End: 2019-03-30
Payer: COMMERCIAL

## 2019-03-30 VITALS
HEART RATE: 91 BPM | RESPIRATION RATE: 16 BRPM | SYSTOLIC BLOOD PRESSURE: 119 MMHG | DIASTOLIC BLOOD PRESSURE: 69 MMHG | HEIGHT: 73 IN | OXYGEN SATURATION: 100 % | BODY MASS INDEX: 31.26 KG/M2 | WEIGHT: 235.89 LBS | TEMPERATURE: 98.2 F

## 2019-03-30 LAB
ALBUMIN SERPL-MCNC: 3.6 G/DL (ref 3.4–5)
ALP SERPL-CCNC: 77 U/L (ref 40–150)
ALT SERPL W P-5'-P-CCNC: 41 U/L (ref 0–70)
ANION GAP SERPL CALCULATED.3IONS-SCNC: 11 MMOL/L (ref 3–14)
AST SERPL W P-5'-P-CCNC: 70 U/L (ref 0–45)
BILIRUB SERPL-MCNC: 0.6 MG/DL (ref 0.2–1.3)
BUN SERPL-MCNC: 10 MG/DL (ref 7–30)
CALCIUM SERPL-MCNC: 8.9 MG/DL (ref 8.5–10.1)
CHLORIDE SERPL-SCNC: 104 MMOL/L (ref 94–109)
CO2 SERPL-SCNC: 27 MMOL/L (ref 20–32)
CREAT SERPL-MCNC: 0.86 MG/DL (ref 0.66–1.25)
ERYTHROCYTE [DISTWIDTH] IN BLOOD BY AUTOMATED COUNT: 12.3 % (ref 10–15)
GFR SERPL CREATININE-BSD FRML MDRD: >90 ML/MIN/{1.73_M2}
GLUCOSE SERPL-MCNC: 188 MG/DL (ref 70–99)
HCT VFR BLD AUTO: 44.5 % (ref 40–53)
HGB BLD-MCNC: 14.9 G/DL (ref 13.3–17.7)
MAGNESIUM SERPL-MCNC: 1.6 MG/DL (ref 1.6–2.3)
MCH RBC QN AUTO: 30.9 PG (ref 26.5–33)
MCHC RBC AUTO-ENTMCNC: 33.5 G/DL (ref 31.5–36.5)
MCV RBC AUTO: 92 FL (ref 78–100)
PLATELET # BLD AUTO: 161 10E9/L (ref 150–450)
POTASSIUM SERPL-SCNC: 4.7 MMOL/L (ref 3.4–5.3)
PROT SERPL-MCNC: 6.9 G/DL (ref 6.8–8.8)
RBC # BLD AUTO: 4.82 10E12/L (ref 4.4–5.9)
SODIUM SERPL-SCNC: 142 MMOL/L (ref 133–144)
WBC # BLD AUTO: 7.6 10E9/L (ref 4–11)

## 2019-03-30 PROCEDURE — 36415 COLL VENOUS BLD VENIPUNCTURE: CPT | Performed by: INTERNAL MEDICINE

## 2019-03-30 PROCEDURE — 25000128 H RX IP 250 OP 636: Performed by: INTERNAL MEDICINE

## 2019-03-30 PROCEDURE — 25000132 ZZH RX MED GY IP 250 OP 250 PS 637

## 2019-03-30 PROCEDURE — 96374 THER/PROPH/DIAG INJ IV PUSH: CPT

## 2019-03-30 PROCEDURE — 99217 ZZC OBSERVATION CARE DISCHARGE: CPT | Mod: GC | Performed by: INTERNAL MEDICINE

## 2019-03-30 PROCEDURE — 93010 ELECTROCARDIOGRAM REPORT: CPT | Performed by: INTERNAL MEDICINE

## 2019-03-30 PROCEDURE — 96375 TX/PRO/DX INJ NEW DRUG ADDON: CPT

## 2019-03-30 PROCEDURE — 83735 ASSAY OF MAGNESIUM: CPT | Performed by: INTERNAL MEDICINE

## 2019-03-30 PROCEDURE — 71046 X-RAY EXAM CHEST 2 VIEWS: CPT

## 2019-03-30 PROCEDURE — 80053 COMPREHEN METABOLIC PANEL: CPT | Performed by: INTERNAL MEDICINE

## 2019-03-30 PROCEDURE — 25800030 ZZH RX IP 258 OP 636: Performed by: INTERNAL MEDICINE

## 2019-03-30 PROCEDURE — 25000132 ZZH RX MED GY IP 250 OP 250 PS 637: Performed by: INTERNAL MEDICINE

## 2019-03-30 PROCEDURE — G0378 HOSPITAL OBSERVATION PER HR: HCPCS

## 2019-03-30 PROCEDURE — 93005 ELECTROCARDIOGRAM TRACING: CPT

## 2019-03-30 PROCEDURE — 85027 COMPLETE CBC AUTOMATED: CPT | Performed by: INTERNAL MEDICINE

## 2019-03-30 RX ORDER — POTASSIUM CHLORIDE 1.5 G/1.58G
20 POWDER, FOR SOLUTION ORAL 2 TIMES DAILY
Status: DISCONTINUED | OUTPATIENT
Start: 2019-03-30 | End: 2019-03-30 | Stop reason: HOSPADM

## 2019-03-30 RX ORDER — OXYCODONE HYDROCHLORIDE 5 MG/1
5 TABLET ORAL
Status: COMPLETED | OUTPATIENT
Start: 2019-03-30 | End: 2019-03-30

## 2019-03-30 RX ADMIN — POTASSIUM CHLORIDE 20 MEQ: 1.5 POWDER, FOR SOLUTION ORAL at 07:58

## 2019-03-30 RX ADMIN — SOTALOL HYDROCHLORIDE 160 MG: 160 TABLET ORAL at 07:58

## 2019-03-30 RX ADMIN — OXYCODONE HYDROCHLORIDE 5 MG: 5 TABLET ORAL at 01:08

## 2019-03-30 RX ADMIN — AMIODARONE HYDROCHLORIDE 150 MG: 1.5 INJECTION, SOLUTION INTRAVENOUS at 00:30

## 2019-03-30 RX ADMIN — MAGNESIUM SULFATE HEPTAHYDRATE 3 G: 500 INJECTION, SOLUTION INTRAMUSCULAR; INTRAVENOUS at 07:18

## 2019-03-30 RX ADMIN — OMEPRAZOLE 20 MG: 20 CAPSULE, DELAYED RELEASE ORAL at 07:58

## 2019-03-30 ASSESSMENT — PAIN DESCRIPTION - DESCRIPTORS: DESCRIPTORS: SHARP

## 2019-03-30 NOTE — DISCHARGE SUMMARY
[facility], [place]    Discharge Summary  Cardiology    Date of Admission: 3/29/2019  Date of Discharge: 3/30/2019  Discharging Provider: La Christian MD    Discharge Diagnoses   Patient Active Problem List    Diagnosis Date Noted     S/P ablation operation for arrhythmia 03/29/2019     Priority: Medium     Epistaxis 03/29/2019     Priority: Medium     Paroxysmal ventricular tachycardia (H) 02/12/2019     Priority: Medium     Added automatically from request for surgery [id number]       V-tach (H) 01/17/2017     Priority: Medium     H/O cardiac arrest 01/17/2017     Priority: Medium     8/2015       ICD (implantable cardioverter-defibrillator) in place- [employer]- SUBCUTANEOUS 01/17/2017     Priority: Medium     Cardiac sarcoidosis 01/17/2017     Priority: Medium     History of Present Illness   Temo George is an 34 year old male with hx of paroxysmal VT s/p ablation at age 9, cardiac arrest s/p ICD in 2015 now with recurrent shocks admitted for obs s/p successful VT ablation on 3/29/19 c/b epistaxis during the procedure.    Hospital Course   Temo George was admitted on 3/29/2019. The following problems were addressed during his hospitalization:    Patient underwent successful VT ablation on 3/29/19, but Pt developed significant epistaxis during the procedure, so was admitted for overnight observation for this. Pt did not have any recurrence overnight and only scant blood on tissue with blowing his nose this AM. Hb remained stable at 14.9 on the AM of discharge. No recurrence of VT on telemetry overnight and post-procedure EKGs and CXRs have been stable. No other acute complaints at time of discharge.  - Continue PTA sotalol 160 mg BID  - Please follow-up with Dr. Benjamin in 4 weeks    Pt was seen and discussed with the staff attending, Dr. Christian, who agrees with the above.    Kimberly Tohmas MD  Cardiology Fellow  [phone]    Significant Results and Procedures    VT Ablation    Code Status   Full Code    Primary Care Physician   Jovanni Lam    Physical Exam   Temp: 98.1  F (36.7  C) Temp src: Oral BP: 112/71 Pulse: 91 Heart Rate: 77 Resp: 16 SpO2: 98 % O2 Device: None (Room air) Oxygen Delivery: 2 LPM  Vitals:    03/29/19 0634   Weight: 107 kg (235 lb 14.3 oz)     Vital Signs with Ranges  Temp:  [98  F (36.7  C)-98.8  F (37.1  C)] 98.1  F (36.7  C)  Pulse:  [62-91] 91  Heart Rate:  [] 77  Resp:  [9-16] 16  BP: ()/(63-90) 112/71  SpO2:  [97 %-100 %] 98 %  I/O last 3 completed shifts:  In: 2420 [P.O.:420; I.V.:2000]  Out: 4115 [Urine:4000; Emesis/NG output:115]  Gen: alert, interactive, NAD  HEENT: EOMI, neck supple, no blood in nares  CV: RRR, no m/r/g  Lungs: CTAB, no wheezes or crackles  Abd: soft, NT, ND  Groin: b/l groin sites are c/d/i  Ext: warm and well perfused, 2+ DP and PT pulses b/l  Skin: no rashes on exposed surfaces  Neuro: alert and oriented, no focal deficits    Time Spent on this Encounter   I personally saw the patient today and spent greater than 30 minutes discharging this patient.    Discharge Disposition   Discharged to home  Condition at discharge: Stable    Consultations This Hospital Stay   None    Discharge Orders      Reason for your hospital stay    Admitted for observation after VT ablation procedure complicated by significant nose bleed.     Adult [facility] Follow-up and recommended labs and tests    Follow up with Dr. Benjamin , at (location with clinic name or city) [facility], within 4 weeks  for hospital follow- up. No follow up labs or test are needed.    Appointments on [place] and/or [facility] (with [facility] or [facility] provider or service). Call [phone] if you haven't heard regarding these appointments within 7 days of discharge.     Activity    Your activity upon discharge: activity as tolerated. Avoid lifting anything heavier than 10 lbs for next 5-7 days.     When to contact your care team    Call Dr. Benjamin's clinic if  you have any of the following: new bleeding, swelling, pain, numbness of legs     Discharge Instructions    Continue taking sotalol 160 mg twice daily.     Full Code     Diet    Follow this diet upon discharge: Regular     Discharge Medications   Current Discharge Medication List      CONTINUE these medications which have NOT CHANGED    Details   MAGNESIUM PO Magnesium Taurate 400 mg tab. 1 tablet by mouth two times a day.      omeprazole (PRILOSEC) 20 MG CR capsule 20 mg daily       sotalol (BETAPACE) 160 MG tablet Take 80 mg by mouth 2 times daily Take 2 tabs (80mg) twice daily         STOP taking these medications       predniSONE (DELTASONE) 5 MG tablet Comments:   Reason for Stopping:         PREDNISONE PO Comments:   Reason for Stopping:         sulfamethoxazole-trimethoprim (BACTRIM DS) 800-160 MG per tablet Comments:   Reason for Stopping:             Allergies   Allergies   Allergen Reactions     Penicillins      Data   Most Recent 3 CBC's:  Recent Labs   Lab Test 03/30/19  0306 03/29/19  1832 03/29/19  0656 01/17/17  1522   WBC 7.6  --  5.8 9.5   HGB 14.9 11.9* 14.8 16.4   MCV 92  --  92 92     --  144* 214      Most Recent 3 BMP's:  Recent Labs   Lab Test 03/30/19  0306 03/29/19  1832 03/29/19  0656 11/01/17  1303    142 140 138   POTASSIUM 4.7 3.3* 3.7 4.0   CHLORIDE 104  --  107 105   CO2 27  --  26 27   BUN 10  --  15 18   CR 0.86  --  0.83 0.81   ANIONGAP 11  --  6 6   REGULO 8.9  --  8.2* 8.6   * 105* 96 102*     Most Recent 2 LFT's:  Recent Labs   Lab Test 03/30/19  0306 01/17/17  1522   AST 70* 17   ALT 41 45   ALKPHOS 77 80   BILITOTAL 0.6 0.4     Most Recent INR's and Anticoagulation Dosing History:  Anticoagulation Dose History     Recent Dosing and Labs Latest Ref Rng & Units 3/29/2019    INR 0.86 - 1.14 1.05        Most Recent 3 Troponin's:  Recent Labs   Lab Test 11/01/17  1303 01/17/17  1522   TROPI <0.015 <0.015  The 99th percentile for upper reference range is 0.045 ug/L.   Troponin values in   the range of 0.045 - 0.120 ug/L may be associated with risks of adverse   clinical events.       Most Recent Cholesterol Panel:No lab results found.  Most Recent 6 Bacteria Isolates From Any Culture (See EPIC Reports for Culture Details):  Recent Labs   Lab Test 01/18/17  0902   CULT Culture negative for acid fast bacilli  Assayed at [facility] [address]    Culture negative after 4 weeks  No growth     Most Recent TSH, T4 and A1c Labs:No lab results found.  Results for orders placed or performed during the hospital encounter of 03/29/19   X-ray Chest 1 vw port    Narrative    XR CHEST PORT 1 VW 3/29/2019 10:03 PM    History: post ablation    Comparison: None.    Findings: Single AP view the chest at 10 degrees upright. The heart  size is normal. There is an implanted defibrillator in place overlying  the left heart. No focal airspace opacities. No significant pleural  effusion or pneumothorax.      Impression    Impression: Clear lungs. No evidence of a pneumothorax.   X-ray Chest 2 vws*    Narrative    XR CHEST 2 VW  3/30/2019 8:47 AM      HISTORY: post ablation    COMPARISON: Chest x-ray 3/29/2019    TECHNIQUE: Upright frontal and lateral views of the chest.    FINDINGS: No focal airspace opacity, pneumothorax, or pleural  effusion. Cardiomediastinal silhouette and pulmonary vasculature are  within normal limits. Subcutaneous cardiac lead and generator  projecting over the left chest. The trachea is midline. [place]  lucency under the left hemidiaphragm favored to be air-fluid level in  the stomach.. No suspicious osseous lesion.      Impression    IMPRESSION: No pneumothorax. Stable appearance of the subcutaneous  cardiac lead with interval placement of generator over the left  lateral chest.    I have personally reviewed the examination and initial interpretation  and I agree with the findings.    MAYANK TREJO

## 2019-03-30 NOTE — PLAN OF CARE
"Observation goals PRIOR TO DISCHARGE     Comments: -diagnostic tests and consults completed and resulted; No     -vital signs normal or at patient baseline: Yes, /70   Pulse 91   Temp 98  F (36.7  C) (Oral)   Resp 16   Ht 1.854 m (6' 1\")   Wt 107 kg (235 lb 14.3 oz)   SpO2 97%   BMI 31.12 kg/m      The Pt s/p VT ablation and Coronary angio. Bilateral groin sites, soft and palpable, no hematoma noted, sites CDI. Right groin site with small dried  blood unchanged.  Bed rest off at 0105. Sitting up in bed and diet was advanced to full liquid and tolerated. No n/v reported.  He ate, and ambulated on the unit. Denies cp and sob with ambulation. Bilateral groin sites continue to be cdi. Capno in place and wnl. Pack removed at 0300 and he is DTV at 0700.  CXR this AM. Sleeping in bed. No acute changes overnight. Call light within reach     urse to notify provider when observation goals have been met and patient is ready for discharge.            "

## 2019-03-30 NOTE — H&P
[facility]    History and Physical - Cards 1 Service        Date of Admission:  3/29/2019    Assessment & Plan   Temo George is a 34 year old male admitted to observation on 3/29/2019 due to persistent nasal bleeding after elective ablation procedure for sustained ventricular tachycardia.    Epistaxis secondary to trauma  Presented for elective ablation procedure for VTach on 3/29, underwent successful procedure, received heparin during procedure, experienced epistaxis related to nasal trumpet, so admitted to observation to ensure bleeding under control overnight. Can use oxymetazoline PRN if recurs. Re-check Hb in AM.    Recurrent sustained VTach s/p ablation (3/29)  Lifelong problem, underwent ablation at age 8 y/o, h/o cardiac arrest (8/2015) s/p ICD insertion (8/20/2015), experienced recurrent shocks (8/2016, 10/2016). Currently being followed by cards at [facility], etiology is thought to be possible arrhythmogenic left ventricular cardiomyopathy. Monitor on telemetry tonight. Meds: sotalol 160 BID and amiodarone 150 mg over 30 min.       Diet: Advance Diet as Tolerated: Clear Liquid Diet    Fluids: no  DVT Prophylaxis: no  Pack Catheter: in place, indication: Strict 1-2 Hour I&O  Code Status: FULL    Disposition Plan   Expected discharge: Tomorrow, recommended to prior living arrangement once bleeding resolved.  Entered: Amando Thomas MD 03/30/2019, 12:59 AM     To be staffed in AM.    Amando Thomas MD, MPH  Cardiology Service  [facility], [place]    ______________________________________________________________________    Chief Complaint   epistaxis    History is obtained from the patient    History of Present Illness   35 y/o man with h/o recurrent sustained ventricular tachyardia since childhood, thought to be due to arrhythmogenic left ventricular cardiomyopathy, presents for elective VTach ablation procedure on 3/29, experienced  significant nose bleed related to nasal trumpet during procedure while on heparin drip, admitted to observation overnight.    Review of Systems    The 10 point Review of Systems is negative other than noted in the HPI or here.     Past Medical History    I have reviewed this patient's medical history and updated it with pertinent information if needed.   Past Medical History:   Diagnosis Date     Cardiac arrest (H)      Cardiac sarcoidosis 1/17/2017     H/O cardiac arrest 1/17/2017 8/2015     ICD (implantable cardioverter-defibrillator) in place 01/17/2017     Pulmonary embolism (H)     h/o that occurred in hospital     SVT (supraventricular tachycardia) (H)      V-tach (H) 1/17/2017        Past Surgical History   I have reviewed this patient's surgical history and updated it with pertinent information if needed.  Past Surgical History:   Procedure Laterality Date     BRONCHOSCOPY RIGID OR FLEXIBLE W/TRANSENDOSCOPIC ENDOBRONCHIAL ULTRASOUND GUIDED N/A 1/18/2017    Procedure: BRONCHOSCOPY RIGID OR FLEXIBLE W/TRANSENDOSCOPIC ENDOBRONCHIAL ULTRASOUND GUIDED;  Surgeon: Jose Antonio Hogue MD;  Location: [facility] OR     EP ABLATION CHILD       s/p ICD placement      [employer] defibrillator        Social History   I have reviewed this patient's social history and updated it with pertinent information if needed. Temo George  reports that  has never smoked. He does not have any smokeless tobacco history on file. He reports that he drinks alcohol. He reports that he does not use drugs.    Family History   I have reviewed this patient's family history and updated it with pertinent information if needed.   History reviewed. No pertinent family history.    Prior to Admission Medications   Prior to Admission Medications   Prescriptions Last Dose Informant Patient Reported? Taking?   MAGNESIUM PO 3/29/2019 at 0445  Yes Yes   Sig: Magnesium Taurate 400 mg tab. 1 tablet by mouth two times a day.   PREDNISONE PO   Yes  No   Sig: Take 20 mg by mouth daily   omeprazole (PRILOSEC) 20 MG CR capsule   Yes No   Si mg daily    predniSONE (DELTASONE) 5 MG tablet   No No   Sig: Take as directed one tab daily   sotalol (BETAPACE) 160 MG tablet 3/27/2019  Yes No   Sig: Take 80 mg by mouth 2 times daily Take 2 tabs (80mg) twice daily   sulfamethoxazole-trimethoprim (BACTRIM DS) 800-160 MG per tablet   Yes No      Facility-Administered Medications: None     Allergies   Allergies   Allergen Reactions     Penicillins        Physical Exam   Vital Signs: Temp: 98  F (36.7  C) Temp src: Oral BP: 112/75 Pulse: 85 Heart Rate: 78 Resp: 16 SpO2: 97 % O2 Device: None (Room air) Oxygen Delivery: 2 LPM  Weight: 235 lbs 14.28 oz    GEN: NAD  HEENT: PERRL, no epistaxis, dry blood in nares, no oral ulcers  CV: RRR  RESP: CTAB  ABD: soft, NT/ND, active bowel sounds  EXT: groin sites dressed, no surrounding hematoma or erythema, non-tender, no swelling in lower extremities  NEURO: A&Ox3    Data   Data reviewed today: I reviewed all medications, new labs and imaging results over the last 24 hours. I personally reviewed no images or EKG's today.    Labs and Imaging reviewed

## 2019-03-30 NOTE — DISCHARGE SUMMARY
[facility], [place]    Discharge Summary  Cardiology    Date of Admission:  3/29/2019  Date of Discharge:  3/30/2019  Discharging Provider: La Christian MD    Discharge Diagnoses   Patient Active Problem List    Diagnosis Date Noted     S/P ablation operation for arrhythmia 03/29/2019     Priority: Medium     Epistaxis 03/29/2019     Priority: Medium     Paroxysmal ventricular tachycardia (H) 02/12/2019     Priority: Medium     Added automatically from request for surgery [id number]       V-tach (H) 01/17/2017     Priority: Medium     H/O cardiac arrest 01/17/2017     Priority: Medium     8/2015       ICD (implantable cardioverter-defibrillator) in place- [employer]- SUBCUTANEOUS 01/17/2017     Priority: Medium     Cardiac sarcoidosis 01/17/2017     Priority: Medium     History of Present Illness   Temo George is an 34 year old male with hx of paroxsymal VT since infancy with hx of prior ablation at age 9, prior cardiac arrest s.p ICD 08/2015 with recurrent shocks who was admitted observation after his VT ablation on 3/29/19 c/b by epistaxis.     Hospital Course   Temo George was admitted on 3/29/2019.  The following problems were addressed during his hospitalization:    Patient underwent successful VT ablation on 3/29/19 for paroxysmal VT with recurrent shocks recently. Procedure was complicated by epistaxis during nasal trumpet, so patient was admitted for overnight observation to ensure no further bleeding. Pt only had a small amount of scant blood on tissue when blowing his nose in the AM, otherwise no recurrence. Hb remained stable at 14.9 from 14.9 the day prior on morning of discharge. Post-procedure CXR and EKG were stable with no evidence of VT on telemetry overnight.  - Pt will remain on sotalol 160 mg BID  - Follow-up with Dr. Benjamin in EP clinic within 4 weeks    Pt was seen and discussed with the staff attending, Dr. Christian, who agrees with the  above.    Kimberly Thomas MD  Cardiology Fellow  [phone]    Significant Results and Procedures   VT Ablation    Code Status   Full Code    Primary Care Physician   Jovanni Lam    Physical Exam   Temp: 98.1  F (36.7  C) Temp src: Oral BP: 112/71 Pulse: 91 Heart Rate: 77 Resp: 16 SpO2: 98 % O2 Device: None (Room air) Oxygen Delivery: 2 LPM  Vitals:    03/29/19 0634   Weight: 107 kg (235 lb 14.3 oz)     Vital Signs with Ranges  Temp:  [98  F (36.7  C)-98.8  F (37.1  C)] 98.1  F (36.7  C)  Pulse:  [62-91] 91  Heart Rate:  [] 77  Resp:  [9-16] 16  BP: ()/(63-90) 112/71  SpO2:  [97 %-100 %] 98 %  I/O last 3 completed shifts:  In: 2420 [P.O.:420; I.V.:2000]  Out: 4115 [Urine:4000; Emesis/NG output:115]  Gen: alert, interactive, NAD  HEENT: EOMI, neck supple, no blood in nares  CV: RRR, no m/r/g  Lungs: CTAB, no wheezes or crackles  Abd: soft, NT, ND, +BS  Groin: b/l groin sites are c/d/i  Ext: warm and well perfused, no edema  Skin: no rashes    Time Spent on this Encounter   I personally saw the patient today and spent greater than 30 minutes discharging this patient.    Discharge Disposition   Discharged to home  Condition at discharge: Stable    Consultations This Hospital Stay   None    Discharge Orders      Reason for your hospital stay    Admitted for observation after VT ablation procedure complicated by significant nose bleed.     Adult [facility] Follow-up and recommended labs and tests    Follow up with Dr. Benjamin , at (location with clinic name or city[facility] within 4 weeks  for hospital follow- up. No follow up labs or test are needed.    Appointments on [place] and/or [facility] (with [facility] or [facility] provider or service). Call [phone] if you haven't heard regarding these appointments within 7 days of discharge.     Activity    Your activity upon discharge: activity as tolerated. Avoid lifting anything heavier than 10 lbs for next 5-7 days.     When to contact your care team    Call  Dr. Benjamin's clinic if you have any of the following: new bleeding, swelling, pain, numbness of legs     Discharge Instructions    Continue taking sotalol 160 mg twice daily.     Full Code     Diet    Follow this diet upon discharge: Regular     Discharge Medications   Current Discharge Medication List      CONTINUE these medications which have NOT CHANGED    Details   MAGNESIUM PO Magnesium Taurate 400 mg tab. 1 tablet by mouth two times a day.      omeprazole (PRILOSEC) 20 MG CR capsule 20 mg daily       sotalol (BETAPACE) 160 MG tablet Take 80 mg by mouth 2 times daily Take 2 tabs (80mg) twice daily         STOP taking these medications       predniSONE (DELTASONE) 5 MG tablet Comments:   Reason for Stopping:         PREDNISONE PO Comments:   Reason for Stopping:         sulfamethoxazole-trimethoprim (BACTRIM DS) 800-160 MG per tablet Comments:   Reason for Stopping:             Allergies   Allergies   Allergen Reactions     Penicillins      Data   Most Recent 3 CBC's:  Recent Labs   Lab Test 03/30/19  0306 03/29/19  1832 03/29/19  0656 01/17/17  1522   WBC 7.6  --  5.8 9.5   HGB 14.9 11.9* 14.8 16.4   MCV 92  --  92 92     --  144* 214      Most Recent 3 BMP's:  Recent Labs   Lab Test 03/30/19  0306 03/29/19  1832 03/29/19  0656 11/01/17  1303    142 140 138   POTASSIUM 4.7 3.3* 3.7 4.0   CHLORIDE 104  --  107 105   CO2 27  --  26 27   BUN 10  --  15 18   CR 0.86  --  0.83 0.81   ANIONGAP 11  --  6 6   REGULO 8.9  --  8.2* 8.6   * 105* 96 102*     Most Recent 2 LFT's:  Recent Labs   Lab Test 03/30/19  0306 01/17/17  1522   AST 70* 17   ALT 41 45   ALKPHOS 77 80   BILITOTAL 0.6 0.4     Most Recent INR's and Anticoagulation Dosing History:  Anticoagulation Dose History     Recent Dosing and Labs Latest Ref Rng & Units 3/29/2019    INR 0.86 - 1.14 1.05        Most Recent 3 Troponin's:  Recent Labs   Lab Test 11/01/17  1303 01/17/17  1522   TROPI <0.015 <0.015  The 99th percentile for upper  reference range is 0.045 ug/L.  Troponin values in   the range of 0.045 - 0.120 ug/L may be associated with risks of adverse   clinical events.       Most Recent Cholesterol Panel:No lab results found.  Most Recent 6 Bacteria Isolates From Any Culture (See EPIC Reports for Culture Details):  Recent Labs   Lab Test 01/18/17  0902   CULT Culture negative for acid fast bacilli  Assayed at [facility] [address]    Culture negative after 4 weeks  No growth     Most Recent TSH, T4 and A1c Labs:No lab results found.  Results for orders placed or performed during the hospital encounter of 03/29/19   X-ray Chest 1 vw port    Narrative    XR CHEST PORT 1 VW 3/29/2019 10:03 PM    History: post ablation    Comparison: None.    Findings: Single AP view the chest at 10 degrees upright. The heart  size is normal. There is an implanted defibrillator in place overlying  the left heart. No focal airspace opacities. No significant pleural  effusion or pneumothorax.      Impression    Impression: Clear lungs. No evidence of a pneumothorax.   X-ray Chest 2 vws*    Narrative    XR CHEST 2 VW  3/30/2019 8:47 AM      HISTORY: post ablation    COMPARISON: Chest x-ray 3/29/2019    TECHNIQUE: Upright frontal and lateral views of the chest.    FINDINGS: No focal airspace opacity, pneumothorax, or pleural  effusion. Cardiomediastinal silhouette and pulmonary vasculature are  within normal limits. Subcutaneous cardiac lead and generator  projecting over the left chest. The trachea is midline. [place]  lucency under the left hemidiaphragm favored to be air-fluid level in  the stomach.. No suspicious osseous lesion.      Impression    IMPRESSION: No pneumothorax. Stable appearance of the subcutaneous  cardiac lead with interval placement of generator over the left  lateral chest.    I have personally reviewed the examination and initial interpretation  and I agree with the findings.    MAYANK TREJO

## 2019-03-30 NOTE — PROGRESS NOTES
Dr. Thomas returned page for CXR read to verify no pneumothorax or lead dislodgement. Dr. Thomas verified no pneumothorax and will contact Cardiology fellow to verify lead placement. Okay to go to 6D.

## 2019-03-30 NOTE — ANESTHESIA POSTPROCEDURE EVALUATION
Anesthesia POST Procedure Evaluation    Patient: Temo George   MRN:     [id number] Gender:   male   Age:    34 year old :      1985        Preoperative Diagnosis: ICD shocks, VT   Procedure(s):  EP ABLATION VT  Coronary Angiogram   Postop Comments: No value filed.       Anesthesia Type:  No value filed.    Reportable Event: NO     PAIN: Uncomplicated   Sign Out status: Comfortable, Well controlled pain     PONV: No PONV   Sign Out status:  No Nausea or Vomiting     Neuro/Psych: Uneventful perioperative course   Sign Out Status: Preoperative baseline; Age appropriate mentation     Airway/Resp.:   Sign Out Status: Airway Device present     Airway Device: ETT                 Reason: Unplanned     CV: Uneventful perioperative course   Sign Out status: Appropriate BP and perfusion indices; Appropriate HR/Rhythm     Disposition:   Sign Out in:  PACU  Disposition:  Floor  Recovery Course: Uneventful     Comments/Narrative:  Report was given to me from Dr. Arreola who was caring for the patient. Patient had significant bleeding from the nose during his procedure. Initially, the procedure was started under native airway with sedation. Nasal trumpent was placed and bleeding was noticed. Patient was eventually converted to general with ETT. Patient was given heparin during the case and significant amount of bleeding from the nose occurred unnoticed for a period of time. Hb was stable at 11 and VSS. Patient was kept intubated until the bleeding has subsided. Patient was taken to the PACU intubated and frequent monitoring of his right nare and oral suction was done frequently. Bleeding stopped and eventually the patient was extubated without incident. Patient did well eventually and transferred to the floor.            Last Anesthesia Record Vitals:  CRNA VITALS  3/29/2019 1858 - 3/29/2019 1958      3/29/2019             SpO2:  100 %    Resp Rate (set):  10    EKG:  NSR          Last PACU/Preop Vitals:  Vitals:     03/29/19 2030 03/29/19 2045 03/29/19 2100   BP: 119/77 113/78 116/78   Pulse: 84  75   Resp: 9 14 15   Temp:      SpO2: 99% 100% 100%         Electronically Signed By: Hugh Contreras DO March 29, 2019, 9:17 PM

## 2019-03-30 NOTE — OR NURSING
Pt extubated at 2015 by EM Piper. Pt placed on O2 mask @15 lpm, and suctioned dark red vomitus with 75 ml output. VSS, denies pain, Rt groin puncture site with small sanguinous drainage, pressure applied while extubating. Lt groin puncture site cdi, no hematoma bleeding. Soft to touch. Pulses on BLE present with doppler: DP and PT, CMS intact.

## 2019-03-30 NOTE — OP NOTE
EP Preliminary Op Note    Procedure: VT ablation.    Access: 8Fr and 8.5 Fr sheaths in RFV, 4Fr sheath in RFA, 9Fr sheath in LFV.    Complications: nazal bleeding due to cannulation.    Disposition: Cards 1 service.    Nav Benjamin MD [facility]  Cardiology - Electrophysiology

## 2019-03-30 NOTE — OR NURSING
Notified Dr. Contreras of BP 97/63 with propofol rate currently on 100 mcg/kig/min. Per Md, titrate dose to 50 mcg/kg/min and MD will be at bedside.

## 2019-03-30 NOTE — DISCHARGE INSTRUCTIONS
EP (Electrophysiology) Study or Ablation  Discharge Instructions  __________________________________________      Patient Name: Temo George  Today's Date: March 30, 2019    When you go home, have an adult stay with you until the next day.    Care of your puncture site:    In the first 24 hours: If you cough or sneeze, press firmly on the puncture site.    The day after your test or treatment:    You may take a shower or tub bath.    You may remove the Band-Aid the next day.    Bleeding is rare. If bleeding occurs, press firmly on the site while lying down and call 911.    Activity  Because you have received medicine to sedate you, you should not drive for 48 hours.    To prevent bleeding, for the next 48 hours:    No lifting more than 10 pounds.    No housework, yard work or any activity that makes you sweat.    No stooping or squatting.    No sex.    No alcohol.    Ask your doctor when you can return to work. Most people return to normal activity in 3 to 5 days.    Discomfort  You may have bruising, soreness or a hardening at the puncture site. This is normal.    If you have pain or shortness of breath, you may take Advil (ibuprofen) or Tylenol (acetaminophen).  Call the clinic and talk to a nurse if you have:    An increase in pain, fluid or swelling at the site.    A temperature of 101  F (38.3  C) or higher when taken under the tongue.    Shortness of breath or chest pain that is not relieved by Advil or Tylenol.    Premature heartbeats that occur often and lead to a return of symptoms.          Heart rhythms  You may have some premature heartbeats. These feel very strong. They may make you feel that the fast heart rhythm (tachycardia) is going to start again.  Give it time. The premature beats should occur less often.    Questions?  Call [facility] at [phone].

## 2019-03-30 NOTE — OR NURSING
Dr. Contreras notified for sign out. Md will complete sign out and okay to transfer pt to the floor.

## 2019-03-30 NOTE — OR NURSING
Endorsed care to RN 6D. RN at bedside, bilat groin puncture site cdi, with dried drainage on Rt groin, no bleeding or hematoma noted. Pulses on BLE present with doppler, and marked, CMS intact. Pt hob flat, vss, denies nausea or pain.

## 2019-03-30 NOTE — PLAN OF CARE
"  Observation goals PRIOR TO DISCHARGE     Comments: -diagnostic tests and consults completed and resulted; No       vital signs normal or at patient baseline: Yes, /73   Pulse 87   Temp 98  F (36.7  C) (Oral)   Resp 16   Ht 1.854 m (6' 1\")   Wt 107 kg (235 lb 14.3 oz)   SpO2 97% RA   BMI 31.12 kg/m    The Pt s/p VT ablation and Coronary angio. Bilateral groin sites, soft and palpable, no hematoma noted, sites CDI. Right groin site with small dried marked blood.  Was on bed rest until 0105. Sitting up in bed.Upon arrival to the unit Pt had chicken broth and drank water and apple juice. Complained of lower back pain. Percocet administered x 1 without relief. Provider paged re: sharp aching lower back pain. 1 time order of Oxycodone received and same administered.Pack output 900 ml straw color urine.  160 mg Sotalol administered orally upon admission to the unit. Pt placed on Tele. Denies sob and cp. Capno in place and wnl.      Nurse to notify provider when observation goals have been met and patient is ready for discharge.  "

## 2019-03-30 NOTE — PROGRESS NOTES
Observation Brochure and Video    Observation status based on provider's order.  Observation brochure was given and, Patien stated understanding. Questions answered.  Nikkie Lou

## 2019-03-30 NOTE — OR NURSING
Pt had episode of emesis en route to 6D from PACU, output thick, dark brown emesis approx 40 ml. Pt given zofran 4mg IVP and cleaned pt. Nausea/vomitting resolved.

## 2019-03-30 NOTE — ANESTHESIA CARE TRANSFER NOTE
Patient: Temo George    Procedure(s):  EP ABLATION VT  Coronary Angiogram    Diagnosis: ICD shocks, VT  Diagnosis Additional Information: No value filed.    Anesthesia Type:   No value filed.     Note:  Airway :ETT  Patient transferred to:PACU  Handoff Report: Identifed the Patient, Identified the Reponsible Provider, Reviewed the pertinent medical history, Discussed the surgical course, Reviewed Intra-OP anesthesia mangement and issues during anesthesia, Set expectations for post-procedure period and Allowed opportunity for questions and acknowledgement of understanding      Vitals: (Last set prior to Anesthesia Care Transfer)    CRNA VITALS  3/29/2019 1858 - 3/29/2019 1928      3/29/2019             Resp Rate (set):  10                Electronically Signed By: LIANA Terry CRNA  March 29, 2019  7:28 PM

## 2019-03-30 NOTE — OR NURSING
Pt arrived intubated in PACU from cath lab. Per RADHA Piper, anesthesiologist to assess for epitaxis and bleeding from back of throat and to notify MD when there is absence of bleeding for possible extubation.

## 2019-03-30 NOTE — PROGRESS NOTES
[facility]    Progress Note - Cardiac Electrophysiology Service        Date of Admission:  3/29/2019    Assessment & Plan   Temo George is a 34 year old male admitted to observation on 3/29/2019 due to persistent nasal bleeding after elective ablation procedure for sustained ventricular tachycardia. Ablation was successful and patient's nasal bleeding stopped shortly after returning to floor. Patient has been stable overnight.    1. Epistaxis secondary to trauma. Resolved. Hemoglobin stable between before procedure and this morning. Presented for elective ablation procedure for VTach on 3/29, underwent successful procedure, received heparin during procedure, experienced epistaxis related to nasal trumpet, so admitted to observation to ensure bleeding under control overnight.     2. Recurrent sustained VTach s/p ablation (3/29). Lifelong problem, underwent ablation at age 8 y/o, h/o cardiac arrest (8/2015) s/p ICD insertion (8/20/2015), experienced recurrent shocks (8/2016, 10/2016). Currently being followed by cards at [facility], etiology is thought to be possible arrhythmogenic left ventricular cardiomyopathy.  - Continue on Sotalol 160 mg BID  - Follow up with Dr. Benjamin in clinic in 1 month       The patient's care was discussed with the Attending Physician, Dr. Mcneill, the primary team, and the patients family     Rony Johnson MD PhD  Cardiology Fellow  P:    ______________________________________________________________________    Interval History   No acute events overnight, patient feeling well. No fevers, chills, chest pain, SOB, abdominal pain, nausea, vomiting, or diarrhea.    Data reviewed today: I reviewed all medications, new labs and imaging results over the last 24 hours. I personally reviewed the EKG tracing showing sinus rhythm.    Physical Exam   Vital Signs: Temp: 98.1  F (36.7  C) Temp src: Oral BP: 112/71 Pulse: 91 Heart Rate: 77 Resp: 16  SpO2: 98 % O2 Device: None (Room air) Oxygen Delivery: 2 LPM  Weight: 235 lbs 14.28 oz     General Appearance: Well appearing, no distress  Eyes: TIFFANIE, EOMI  HEENT: NC, AT. MMM.   Respiratory: CTAB, normal work of breathing  Cardiovascular: Regular Rate and Rhythm, normal S1, S2. No murmurs, rubs, gallops  GI: Soft, non-tender, non-distended  Lymph/Hematologic: No asymetric swelling, edema. No bruising.  Genitourinary: Deferred  Skin: No rashes, lesions, wounds.  Musculoskeletal: Warm, well perfused  Neurologic: AOx4, CNII-XII intact.  Psychiatric: Euthymic. Mood appropriate.     Data   Recent Labs   Lab 03/30/19  0306 03/29/19  1832 03/29/19  0656   WBC 7.6  --  5.8   HGB 14.9 11.9* 14.8   MCV 92  --  92     --  144*   INR  --   --  1.05    142 140   POTASSIUM 4.7 3.3* 3.7   CHLORIDE 104  --  107   CO2 27  --  26   BUN 10  --  15   CR 0.86  --  0.83   ANIONGAP 11  --  6   REGULO 8.9  --  8.2*   * 105* 96   ALBUMIN 3.6  --   --    PROTTOTAL 6.9  --   --    BILITOTAL 0.6  --   --    ALKPHOS 77  --   --    ALT 41  --   --    AST 70*  --   --      Recent Results (from the past 24 hour(s))   X-ray Chest 1 vw port    Narrative    XR CHEST PORT 1 VW 3/29/2019 10:03 PM    History: post ablation    Comparison: None.    Findings: Single AP view the chest at 10 degrees upright. The heart  size is normal. There is an implanted defibrillator in place overlying  the left heart. No focal airspace opacities. No significant pleural  effusion or pneumothorax.      Impression    Impression: Clear lungs. No evidence of a pneumothorax.   X-ray Chest 2 vws*    Narrative    XR CHEST 2 VW  3/30/2019 8:47 AM      HISTORY: post ablation    COMPARISON: Chest x-ray 3/29/2019    TECHNIQUE: Upright frontal and lateral views of the chest.    FINDINGS: No focal airspace opacity, pneumothorax, or pleural  effusion. Cardiomediastinal silhouette and pulmonary vasculature are  within normal limits. Subcutaneous cardiac lead and  generator  projecting over the left chest. The trachea is midline. [place]  lucency under the left hemidiaphragm favored to be air-fluid level in  the stomach.. No suspicious osseous lesion.      Impression    IMPRESSION: No pneumothorax. Stable appearance of the subcutaneous  cardiac lead with interval placement of generator over the left  lateral chest.    I have personally reviewed the examination and initial interpretation  and I agree with the findings.    MAYANK TREJO     Medications       omeprazole  20 mg Oral QAM AC     potassium chloride  20 mEq Oral BID     sodium chloride (PF)  3 mL Intracatheter Q8H     sotalol  160 mg Oral Q12H MARIO

## 2019-03-31 ENCOUNTER — PATIENT OUTREACH (OUTPATIENT)
Dept: CARE COORDINATION | Facility: CLINIC | Age: 34
End: 2019-03-31

## 2019-04-01 ENCOUNTER — TELEPHONE (OUTPATIENT)
Dept: CARDIOLOGY | Facility: CLINIC | Age: 34
End: 2019-04-01

## 2019-04-01 LAB
INTERPRETATION ECG - MUSE: NORMAL
INTERPRETATION ECG - MUSE: NORMAL

## 2019-04-01 NOTE — TELEPHONE ENCOUNTER
[facility] Call Center    Phone Message    May a detailed message be left on voicemail: yes    Reason for Call: Other: Pt was discharged from the hospital on 3/30 and is to f/u with Dr. Benjamin within 1 month. Pt is wondering if he needs to complete any imaging prior to his f/u. He said that he thought someone had mentioned him needing an MRI but wasnt sure. Please f/u with pt to advise. Thanks!      Action Taken: Message routed to:  [facility] ([facility] Cardiology

## 2019-04-01 NOTE — PROGRESS NOTES
[facility]: Post-Discharge Note  SITUATION                                                      Admission:    Admission Date: 03/29/19   Reason for Admission: S/P ablation operation for arrhythmia  Discharge:   Discharge Date: 03/30/19  Discharge Diagnosis: S/P ablation operation for arrhythmia  Discharge Service: Cardiology     BACKGROUND                                                      Temo George is an 34 year old male with hx of paroxsymal VT since infancy with hx of prior ablation at age 9, prior cardiac arrest s.p ICD 08/2015 with recurrent shocks who was admitted observation after his VT ablation on 3/29/19 c/b by epistaxis.     ASSESSMENT      Discharge Assessment  Patient reports symptoms are: Improved  Does the patient have all of their medications?: Yes  Does patient know what their new medications are for?: Yes  Does patient have a follow-up appointment scheduled?: No  Does patient have any other questions or concerns?: No    Post-op  Did the patient have surgery or a procedure: Yes  Fever: No  Chills: No  Eating & Drinking: eating and drinking without complaints/concerns  PO Intake: regular diet  Bowel Function: normal  Urinary Status: voiding without complaint/concerns    PLAN                                                      Outpatient Plan:      Follow up with Dr. Benjamin , at (location with clinic name or city) [facility], within 4 weeks  for hospital follow- up. No follow up labs or test are needed.    No future appointments.        Elsie Guerra, CMA

## 2019-04-05 NOTE — TELEPHONE ENCOUNTER
Pt called to f/u regarding his imaging question. He is still unsure if imaging is being requested of him at his f/u and would like to know what the f/u will consist of as far a testing/imaging. Please call pt back to discuss these details. Thanks!      Spoke with patient about above phone call. Per his discharge orders he does not need any testing prior to return visit. An EKG and possible monitor will be repeated at time of post-op visit. He states he was told 3 month follow up with Dr. Benjamin is needed. Will send message to Bev. to schedule. He has no complaints regarding procedure.

## 2019-04-07 NOTE — ANESTHESIA PREPROCEDURE EVALUATION
Anesthesia Pre-Procedure Evaluation    Patient: Temo George   MRN:     [id number] Gender:   male   Age:    34 year old :      1985        Preoperative Diagnosis: ICD shocks, VT   Procedure(s):  EP ABLATION VT  Coronary Angiogram     Past Medical History:   Diagnosis Date     Cardiac arrest (H)      Cardiac sarcoidosis 2017     H/O cardiac arrest 2017     ICD (implantable cardioverter-defibrillator) in place 2017     Pulmonary embolism (H)     h/o that occurred in hospital     SVT (supraventricular tachycardia) (H)      V-tach (H) 2017      Past Surgical History:   Procedure Laterality Date     BRONCHOSCOPY RIGID OR FLEXIBLE W/TRANSENDOSCOPIC ENDOBRONCHIAL ULTRASOUND GUIDED N/A 2017    Procedure: BRONCHOSCOPY RIGID OR FLEXIBLE W/TRANSENDOSCOPIC ENDOBRONCHIAL ULTRASOUND GUIDED;  Surgeon: Jose Antonio Hogue MD;  Location: [facility] OR     CV CORONARY ANGIOGRAM N/A 3/29/2019    Procedure: Coronary Angiogram;  Surgeon: Nav Benjamin MD;  Location:  HEART CARDIAC CATH LAB     EP ABLATION CHILD       EP ABLATION VT N/A 3/29/2019    Procedure: EP ABLATION VT;  Surgeon: Nav Benjamin MD;  Location:  HEART CARDIAC CATH LAB     s/p ICD placement      [employer] defibrillator               [facility] AN PHYSICAL EXAM    Lab Results   Component Value Date    WBC 7.6 2019    HGB 14.9 2019    HCT 44.5 2019     2019    CRP <2.9 2017     2019    POTASSIUM 4.7 2019    CHLORIDE 104 2019    CO2 27 2019    BUN 10 2019    CR 0.86 2019     (H) 2019    REGULO 8.9 2019    MAG 1.6 2019    ALBUMIN 3.6 2019    PROTTOTAL 6.9 2019    ALT 41 2019    AST 70 (H) 2019    ALKPHOS 77 2019    BILITOTAL 0.6 2019    INR 1.05 2019       Preop Vitals  BP Readings from Last 3 Encounters:   19 119/69   17 107/71   17 108/70    Pulse Readings from  "Last 3 Encounters:   03/30/19 91   11/02/17 77   11/01/17 70      Resp Readings from Last 3 Encounters:   03/30/19 16   01/18/17 18    SpO2 Readings from Last 3 Encounters:   03/30/19 100%   11/02/17 95%   11/01/17 94%      Temp Readings from Last 1 Encounters:   03/30/19 36.8  C (98.2  F) (Oral)    Ht Readings from Last 1 Encounters:   03/29/19 1.854 m (6' 1\")      Wt Readings from Last 1 Encounters:   03/29/19 107 kg (235 lb 14.3 oz)    Estimated body mass index is 31.12 kg/m  as calculated from the following:    Height as of this encounter: 1.854 m (6' 1\").    Weight as of this encounter: 107 kg (235 lb 14.3 oz).     LDA:  ETT (adult) 8 (Active)   Number of days: 8            Assessment:   ASA SCORE: 2    NPO Status: > 6 hours since completed [employer]   Documentation: H&P complete; Preop Testing complete; Consents complete   Proceeding: Proceed without further delay  Tobacco Use:  NO Active use of Tobacco/UNKNOWN Tobacco use status     Plan:   Anes. Type:  General   Pre-Induction: Midazolam IV; Acetaminophen PO   Induction:  IV (Standard)   Airway: Oral ETT   Access/Monitoring: PIV   Maintenance: Balanced   Emergence: Procedure Site   Logistics: Same Day Surgery     Postop Pain/Sedation Strategy:  Standard-Options: Opioids PRN     PONV Management:  Adult Risk Factors:, Non-Smoker, Postop Opioids     CONSENT: Direct conversation   Plan and risks discussed with: Patient                            Jil Treviño MD  "

## 2019-04-07 NOTE — ADDENDUM NOTE
Addendum  created 04/06/19 2110 by Jil Treviño MD    Intraprocedure Attestations filed, Sign clinical note

## 2019-04-08 ENCOUNTER — TRANSFERRED RECORDS (OUTPATIENT)
Dept: HEALTH INFORMATION MANAGEMENT | Facility: CLINIC | Age: 34
End: 2019-04-08

## 2019-04-08 ENCOUNTER — PATIENT OUTREACH (OUTPATIENT)
Dept: CARDIOLOGY | Facility: CLINIC | Age: 34
End: 2019-04-08

## 2019-04-08 DIAGNOSIS — I42.8 ARRHYTHMOGENIC LEFT VENTRICULAR DYSPLASIA (H): ICD-10-CM

## 2019-04-08 DIAGNOSIS — I47.29 PAROXYSMAL VENTRICULAR TACHYCARDIA (H): Primary | ICD-10-CM

## 2019-04-08 RX ORDER — MEXILETINE HYDROCHLORIDE 150 MG/1
150 CAPSULE ORAL 3 TIMES DAILY
Qty: 90 CAPSULE | Refills: 11 | Status: SHIPPED | OUTPATIENT
Start: 2019-04-08

## 2019-04-08 NOTE — PROGRESS NOTES
Date: 4/8/2019    Time of Call: 4:22 PM     Diagnosis:  Recurrent VT sp VT ablation 3/29/19     [ TORB ] Ordering provider: Linda Mathews NP   Order:   -start Mexiletine 150 mg TID.   -Follow up in 2 months with [facility] here to evaluate if his LDAC scar is worsening.   - Have requested tracings of VT (ED 4/6 - 4/7 at [facility]). Will need to see the tracings to see if anything further would be needed.      Order received by: Julienne Bynum RN      Follow-up/additional notes:     Orders placed. Spoke to patient about medication change, which he is willing to start.  He stated that Dr. Lam, his EP from [facility] in [place] wants to talk to Dr. Benjamin. Linda Mathews NP will attempt to get ahold of Dr. Lam.     Cardiovascular genetics team notified and will schedule cMRI and follow-up appt in their clinic given dx of left dominant arrhythmogenic cardiomyopathy.

## 2019-05-13 ENCOUNTER — DOCUMENTATION ONLY (OUTPATIENT)
Dept: CARE COORDINATION | Facility: CLINIC | Age: 34
End: 2019-05-13

## 2019-05-16 ENCOUNTER — TELEPHONE (OUTPATIENT)
Dept: CARDIOLOGY | Facility: CLINIC | Age: 34
End: 2019-05-16

## 2019-05-16 DIAGNOSIS — Z79.899 ENCOUNTER FOR MONITORING FLECAINIDE THERAPY: Primary | ICD-10-CM

## 2019-05-16 DIAGNOSIS — Z51.81 ENCOUNTER FOR MONITORING FLECAINIDE THERAPY: Primary | ICD-10-CM

## 2019-05-16 NOTE — TELEPHONE ENCOUNTER
Patient called stating his ICD has fired two times this evening, and he passed out. Pt feels well now and reports BP and HR are stable, but his wife said he was unconscious for at least 30 seconds, and immediately became alert afterwards. Pt is having his wife bring him to the local ED right now, but wanted to get a message to Dr. Benjamin about this happening.    Will forward this to Dr. Benjamin.    Kimberly Thomas MD  Cardiology Fellow

## 2019-05-22 NOTE — TELEPHONE ENCOUNTER
Called and spoke to patient. He updated me that he is currently inpatient in [place] after receiving ICD shocks.  He discharged on 5/19/19 at noon on flecainide and coreg. He received a shock that day at 4pm. He was then told to go back on sotalol and take half dose of flecainide. He received 2 more shocks on 5/21/19 and was admitted. He is currently off all oral AATs and on an IV lidocaine drip.     His local EP, Dr. Lam, is working to get him flown to [facility] to undergo endo/epi VT ablation with Dr. Morelos.     Updated Dr. Benjamin on patient status.

## 2019-10-04 ENCOUNTER — HEALTH MAINTENANCE LETTER (OUTPATIENT)
Age: 34
End: 2019-10-04

## 2020-11-08 ENCOUNTER — HEALTH MAINTENANCE LETTER (OUTPATIENT)
Age: 35
End: 2020-11-08

## 2021-09-11 ENCOUNTER — HEALTH MAINTENANCE LETTER (OUTPATIENT)
Age: 36
End: 2021-09-11

## 2022-01-01 ENCOUNTER — HEALTH MAINTENANCE LETTER (OUTPATIENT)
Age: 37
End: 2022-01-01

## 2022-10-30 ENCOUNTER — HEALTH MAINTENANCE LETTER (OUTPATIENT)
Age: 37
End: 2022-10-30

## 2023-04-08 ENCOUNTER — HEALTH MAINTENANCE LETTER (OUTPATIENT)
Age: 38
End: 2023-04-08

## (undated) DEVICE — INTRO CATH 12CM 8.5FR FST-CATH

## (undated) DEVICE — SYR 30ML SLIP TIP W/O NDL 302833

## (undated) DEVICE — INTRODUCER SHEATH FAST-CATH CATH-LOCK 8FRX12CM 406706

## (undated) DEVICE — TUBE SET SMARKABLATE IRRIGATION

## (undated) DEVICE — CATH ANGIO INFINITI JL4 4FRX100CM 538420

## (undated) DEVICE — SPECIMEN TRAP MUCOUS 40ML LUKI C30200A

## (undated) DEVICE — GOWN XLG DISP 9545

## (undated) DEVICE — TUBING SUCTION 10'X3/16" N510

## (undated) DEVICE — SYR 10ML SLIP TIP W/O NDL 303134

## (undated) DEVICE — PAD DEFIBRILLATOR ELECTRODE 4.25INX6IN ADULT 2001M-C

## (undated) DEVICE — SOL NACL 0.9% IRRIG 1000ML BOTTLE 2F7124

## (undated) DEVICE — CATH SOUNDSTAR 8FRX90CM 10439011

## (undated) DEVICE — INTRODUCER SHEATH FAST-CATH 9FRX12CM 406116

## (undated) DEVICE — SYR 30ML LL W/O NDL 302832

## (undated) DEVICE — NDL TRANSSEPTAL BRK XS 18GA 71CM BRK-1 CVD G407209

## (undated) DEVICE — ENDO BITE BLOCK ADULT OMNI-BLOC

## (undated) DEVICE — INTRO SHEATH 4FRX10CM PINNACLE RSS402

## (undated) DEVICE — CATH EP RESPONSE 6FRX120CM 5MM JSN CURVE 401227

## (undated) DEVICE — CATH THERMOCOOL SMARTTOUCH SF FJ CURVE

## (undated) DEVICE — PITCHER STERILE 1000ML  SSK9004A

## (undated) DEVICE — CONNECTOR STOPCOCK 3 WAY MALE LL HI-FLO MX9311L

## (undated) DEVICE — INTRO SHEATH MICRO PLATINUM TIP 4FRX40CM 7274

## (undated) DEVICE — CATH EP 7FR X 115CM DECANAV CA

## (undated) DEVICE — INTRODUCER SHEATH FAST-CATH SWARTZ 8.5FRX63CM SL1 CVD 406849

## (undated) DEVICE — DRAPE MAYO STAND 23X54 8337

## (undated) DEVICE — ENDO VALVE SUCTION ULTRASOUND BRONCH MAJ-1414

## (undated) DEVICE — KIT VENOUS FLUSH 60210177

## (undated) DEVICE — ENDO VALVE SYR NDL KIT ULTRASOUND BRONCH NA-201SX-4022-A

## (undated) DEVICE — PATCH CARTO 3 EXTERNAL REFERENCE 3D MAPPING CREFP6

## (undated) DEVICE — PACK HEART LEFT CUSTOM

## (undated) DEVICE — LABEL MEDICATION SYSTEM 3303-P

## (undated) DEVICE — ENDO FORCEP ALLIGATOR JAW BIOPSY 2MMX100CM FB-211D

## (undated) DEVICE — DRSG TELFA 3X8" 1238

## (undated) RX ORDER — METHYLPREDNISOLONE SODIUM SUCCINATE 125 MG/2ML
INJECTION, POWDER, LYOPHILIZED, FOR SOLUTION INTRAMUSCULAR; INTRAVENOUS
Status: DISPENSED
Start: 2019-03-29

## (undated) RX ORDER — PROPOFOL 10 MG/ML
INJECTION, EMULSION INTRAVENOUS
Status: DISPENSED
Start: 2019-03-29

## (undated) RX ORDER — PHENYLEPHRINE HCL IN 0.9% NACL 1 MG/10 ML
SYRINGE (ML) INTRAVENOUS
Status: DISPENSED
Start: 2019-03-29

## (undated) RX ORDER — HEPARIN SODIUM 1000 [USP'U]/ML
INJECTION, SOLUTION INTRAVENOUS; SUBCUTANEOUS
Status: DISPENSED
Start: 2019-03-29

## (undated) RX ORDER — PROTAMINE SULFATE 10 MG/ML
INJECTION, SOLUTION INTRAVENOUS
Status: DISPENSED
Start: 2019-03-29

## (undated) RX ORDER — ONDANSETRON 2 MG/ML
INJECTION INTRAMUSCULAR; INTRAVENOUS
Status: DISPENSED
Start: 2019-03-29

## (undated) RX ORDER — FENTANYL CITRATE 50 UG/ML
INJECTION, SOLUTION INTRAMUSCULAR; INTRAVENOUS
Status: DISPENSED
Start: 2019-03-29

## (undated) RX ORDER — CLINDAMYCIN PHOSPHATE 600 MG/50ML
INJECTION, SOLUTION INTRAVENOUS
Status: DISPENSED
Start: 2019-03-29